# Patient Record
Sex: MALE | Race: WHITE | NOT HISPANIC OR LATINO
[De-identification: names, ages, dates, MRNs, and addresses within clinical notes are randomized per-mention and may not be internally consistent; named-entity substitution may affect disease eponyms.]

---

## 2017-01-19 ENCOUNTER — APPOINTMENT (OUTPATIENT)
Dept: NEUROLOGY | Facility: CLINIC | Age: 67
End: 2017-01-19

## 2017-01-19 VITALS
HEART RATE: 82 BPM | WEIGHT: 230 LBS | DIASTOLIC BLOOD PRESSURE: 83 MMHG | BODY MASS INDEX: 31.15 KG/M2 | HEIGHT: 72 IN | SYSTOLIC BLOOD PRESSURE: 124 MMHG

## 2017-01-24 ENCOUNTER — FORM ENCOUNTER (OUTPATIENT)
Age: 67
End: 2017-01-24

## 2017-01-25 ENCOUNTER — OUTPATIENT (OUTPATIENT)
Dept: OUTPATIENT SERVICES | Facility: HOSPITAL | Age: 67
LOS: 1 days | End: 2017-01-25
Payer: MEDICARE

## 2017-01-25 ENCOUNTER — APPOINTMENT (OUTPATIENT)
Dept: MRI IMAGING | Facility: CLINIC | Age: 67
End: 2017-01-25

## 2017-01-25 DIAGNOSIS — Z98.89 OTHER SPECIFIED POSTPROCEDURAL STATES: Chronic | ICD-10-CM

## 2017-01-25 DIAGNOSIS — D32.9 BENIGN NEOPLASM OF MENINGES, UNSPECIFIED: ICD-10-CM

## 2017-01-25 PROCEDURE — 70544 MR ANGIOGRAPHY HEAD W/O DYE: CPT

## 2017-01-26 ENCOUNTER — FORM ENCOUNTER (OUTPATIENT)
Age: 67
End: 2017-01-26

## 2017-01-27 ENCOUNTER — APPOINTMENT (OUTPATIENT)
Dept: MRI IMAGING | Facility: CLINIC | Age: 67
End: 2017-01-27

## 2017-01-27 ENCOUNTER — APPOINTMENT (OUTPATIENT)
Dept: OTOLARYNGOLOGY | Facility: CLINIC | Age: 67
End: 2017-01-27

## 2017-01-27 ENCOUNTER — OUTPATIENT (OUTPATIENT)
Dept: OUTPATIENT SERVICES | Facility: HOSPITAL | Age: 67
LOS: 1 days | End: 2017-01-27
Payer: MEDICARE

## 2017-01-27 VITALS
SYSTOLIC BLOOD PRESSURE: 144 MMHG | DIASTOLIC BLOOD PRESSURE: 89 MMHG | HEART RATE: 76 BPM | HEIGHT: 72 IN | BODY MASS INDEX: 31.15 KG/M2 | WEIGHT: 230 LBS

## 2017-01-27 DIAGNOSIS — J34.2 DEVIATED NASAL SEPTUM: ICD-10-CM

## 2017-01-27 DIAGNOSIS — H61.23 IMPACTED CERUMEN, BILATERAL: ICD-10-CM

## 2017-01-27 DIAGNOSIS — H90.3 SENSORINEURAL HEARING LOSS, BILATERAL: ICD-10-CM

## 2017-01-27 DIAGNOSIS — D32.9 BENIGN NEOPLASM OF MENINGES, UNSPECIFIED: ICD-10-CM

## 2017-01-27 DIAGNOSIS — H93.13 TINNITUS, BILATERAL: ICD-10-CM

## 2017-01-27 DIAGNOSIS — Z98.89 OTHER SPECIFIED POSTPROCEDURAL STATES: Chronic | ICD-10-CM

## 2017-01-27 DIAGNOSIS — Z00.8 ENCOUNTER FOR OTHER GENERAL EXAMINATION: ICD-10-CM

## 2017-01-27 DIAGNOSIS — J31.0 CHRONIC RHINITIS: ICD-10-CM

## 2017-01-27 DIAGNOSIS — Z83.3 FAMILY HISTORY OF DIABETES MELLITUS: ICD-10-CM

## 2017-01-27 PROCEDURE — 70551 MRI BRAIN STEM W/O DYE: CPT

## 2017-02-21 ENCOUNTER — APPOINTMENT (OUTPATIENT)
Dept: NEUROLOGY | Facility: CLINIC | Age: 67
End: 2017-02-21

## 2017-03-02 ENCOUNTER — FORM ENCOUNTER (OUTPATIENT)
Age: 67
End: 2017-03-02

## 2017-03-03 ENCOUNTER — APPOINTMENT (OUTPATIENT)
Dept: MRI IMAGING | Facility: CLINIC | Age: 67
End: 2017-03-03

## 2017-03-03 ENCOUNTER — OUTPATIENT (OUTPATIENT)
Dept: OUTPATIENT SERVICES | Facility: HOSPITAL | Age: 67
LOS: 1 days | End: 2017-03-03
Payer: MEDICARE

## 2017-03-03 DIAGNOSIS — I65.29 OCCLUSION AND STENOSIS OF UNSPECIFIED CAROTID ARTERY: ICD-10-CM

## 2017-03-03 DIAGNOSIS — Z98.89 OTHER SPECIFIED POSTPROCEDURAL STATES: Chronic | ICD-10-CM

## 2017-03-08 ENCOUNTER — APPOINTMENT (OUTPATIENT)
Dept: NEUROSURGERY | Facility: CLINIC | Age: 67
End: 2017-03-08

## 2017-03-08 VITALS
BODY MASS INDEX: 30.48 KG/M2 | SYSTOLIC BLOOD PRESSURE: 133 MMHG | DIASTOLIC BLOOD PRESSURE: 88 MMHG | WEIGHT: 225 LBS | RESPIRATION RATE: 16 BRPM | TEMPERATURE: 97.3 F | OXYGEN SATURATION: 95 % | HEIGHT: 72 IN | HEART RATE: 72 BPM

## 2017-03-08 DIAGNOSIS — F41.9 ANXIETY DISORDER, UNSPECIFIED: ICD-10-CM

## 2017-03-08 DIAGNOSIS — R56.9 UNSPECIFIED CONVULSIONS: ICD-10-CM

## 2017-03-08 DIAGNOSIS — I63.9 CEREBRAL INFARCTION, UNSPECIFIED: ICD-10-CM

## 2017-03-08 DIAGNOSIS — C80.1 MALIGNANT (PRIMARY) NEOPLASM, UNSPECIFIED: ICD-10-CM

## 2017-03-09 ENCOUNTER — FORM ENCOUNTER (OUTPATIENT)
Age: 67
End: 2017-03-09

## 2017-03-10 ENCOUNTER — OUTPATIENT (OUTPATIENT)
Dept: OUTPATIENT SERVICES | Facility: HOSPITAL | Age: 67
LOS: 1 days | End: 2017-03-10
Payer: MEDICARE

## 2017-03-10 ENCOUNTER — APPOINTMENT (OUTPATIENT)
Dept: CT IMAGING | Facility: HOSPITAL | Age: 67
End: 2017-03-10

## 2017-03-10 VITALS
HEIGHT: 72 IN | HEART RATE: 80 BPM | RESPIRATION RATE: 14 BRPM | SYSTOLIC BLOOD PRESSURE: 153 MMHG | WEIGHT: 229.94 LBS | DIASTOLIC BLOOD PRESSURE: 95 MMHG | TEMPERATURE: 97 F | OXYGEN SATURATION: 97 %

## 2017-03-10 DIAGNOSIS — Z01.818 ENCOUNTER FOR OTHER PREPROCEDURAL EXAMINATION: ICD-10-CM

## 2017-03-10 DIAGNOSIS — S52.532A COLLES' FRACTURE OF LEFT RADIUS, INITIAL ENCOUNTER FOR CLOSED FRACTURE: Chronic | ICD-10-CM

## 2017-03-10 DIAGNOSIS — G47.33 OBSTRUCTIVE SLEEP APNEA (ADULT) (PEDIATRIC): ICD-10-CM

## 2017-03-10 DIAGNOSIS — I65.21 OCCLUSION AND STENOSIS OF RIGHT CAROTID ARTERY: ICD-10-CM

## 2017-03-10 DIAGNOSIS — C43.59 MALIGNANT MELANOMA OF OTHER PART OF TRUNK: Chronic | ICD-10-CM

## 2017-03-10 DIAGNOSIS — I65.29 OCCLUSION AND STENOSIS OF UNSPECIFIED CAROTID ARTERY: ICD-10-CM

## 2017-03-10 DIAGNOSIS — Z98.89 OTHER SPECIFIED POSTPROCEDURAL STATES: Chronic | ICD-10-CM

## 2017-03-10 DIAGNOSIS — Z79.82 LONG TERM (CURRENT) USE OF ASPIRIN: ICD-10-CM

## 2017-03-10 LAB
ANION GAP SERPL CALC-SCNC: 12 MMOL/L — SIGNIFICANT CHANGE UP (ref 5–17)
BLD GP AB SCN SERPL QL: NEGATIVE — SIGNIFICANT CHANGE UP
BUN SERPL-MCNC: 18 MG/DL — SIGNIFICANT CHANGE UP (ref 7–23)
CALCIUM SERPL-MCNC: 9.6 MG/DL — SIGNIFICANT CHANGE UP (ref 8.4–10.5)
CHLORIDE SERPL-SCNC: 106 MMOL/L — SIGNIFICANT CHANGE UP (ref 96–108)
CO2 SERPL-SCNC: 21 MMOL/L — LOW (ref 22–31)
CREAT SERPL-MCNC: 1 MG/DL — SIGNIFICANT CHANGE UP (ref 0.5–1.3)
GLUCOSE SERPL-MCNC: 137 MG/DL — HIGH (ref 70–99)
HCT VFR BLD CALC: 44 % — SIGNIFICANT CHANGE UP (ref 39–50)
HGB BLD-MCNC: 15.4 G/DL — SIGNIFICANT CHANGE UP (ref 13–17)
MCHC RBC-ENTMCNC: 32.1 PG — SIGNIFICANT CHANGE UP (ref 27–34)
MCHC RBC-ENTMCNC: 35 GM/DL — SIGNIFICANT CHANGE UP (ref 32–36)
MCV RBC AUTO: 91.7 FL — SIGNIFICANT CHANGE UP (ref 80–100)
PLATELET # BLD AUTO: 150 K/UL — SIGNIFICANT CHANGE UP (ref 150–400)
POTASSIUM SERPL-MCNC: 3.9 MMOL/L — SIGNIFICANT CHANGE UP (ref 3.5–5.3)
POTASSIUM SERPL-SCNC: 3.9 MMOL/L — SIGNIFICANT CHANGE UP (ref 3.5–5.3)
RBC # BLD: 4.8 M/UL — SIGNIFICANT CHANGE UP (ref 4.2–5.8)
RBC # FLD: 12.3 % — SIGNIFICANT CHANGE UP (ref 10.3–14.5)
RH IG SCN BLD-IMP: POSITIVE — SIGNIFICANT CHANGE UP
SODIUM SERPL-SCNC: 139 MMOL/L — SIGNIFICANT CHANGE UP (ref 135–145)
WBC # BLD: 5.82 K/UL — SIGNIFICANT CHANGE UP (ref 3.8–10.5)
WBC # FLD AUTO: 5.82 K/UL — SIGNIFICANT CHANGE UP (ref 3.8–10.5)

## 2017-03-10 PROCEDURE — 86900 BLOOD TYPING SEROLOGIC ABO: CPT

## 2017-03-10 PROCEDURE — 86850 RBC ANTIBODY SCREEN: CPT

## 2017-03-10 PROCEDURE — 70498 CT ANGIOGRAPHY NECK: CPT | Mod: 26

## 2017-03-10 PROCEDURE — G0463: CPT

## 2017-03-10 PROCEDURE — 80048 BASIC METABOLIC PNL TOTAL CA: CPT

## 2017-03-10 PROCEDURE — 86901 BLOOD TYPING SEROLOGIC RH(D): CPT

## 2017-03-10 PROCEDURE — 70498 CT ANGIOGRAPHY NECK: CPT

## 2017-03-10 PROCEDURE — 93010 ELECTROCARDIOGRAM REPORT: CPT

## 2017-03-10 PROCEDURE — 85027 COMPLETE CBC AUTOMATED: CPT

## 2017-03-10 PROCEDURE — 93005 ELECTROCARDIOGRAM TRACING: CPT

## 2017-03-10 RX ORDER — CEFAZOLIN SODIUM 1 G
2000 VIAL (EA) INJECTION ONCE
Qty: 0 | Refills: 0 | Status: DISCONTINUED | OUTPATIENT
Start: 2017-03-13 | End: 2017-03-14

## 2017-03-10 NOTE — H&P PST ADULT - NEGATIVE CARDIOVASCULAR SYMPTOMS
no dyspnea on exertion/no paroxysmal nocturnal dyspnea/no peripheral edema/no orthopnea/no palpitations

## 2017-03-10 NOTE — H&P PST ADULT - PMH
Anxiety    Colles' fracture    Fatty liver  dx 1995  H/O meningioma of the brain  s/p craniotomy 2001  Hyperlipidemia    Malignant melanoma  abdomen - s/p excision [facility]  Obstructive sleep apnea  non-compliant with cpap  Occlusion of right carotid artery    Seizure disorder  last seizure 5 years ago, per pt Anxiety    Colles' fracture    ETOH abuse  past history, no ETOH since 1984  Fatty liver  dx 1995  H/O meningioma of the brain  s/p craniotomy for excision 2001  Hyperlipidemia    Malignant melanoma  abdomen - s/p excision [facility]  Obstructive sleep apnea  non-compliant with cpap  Occlusion of right carotid artery    Seizure disorder  last seizure 2011

## 2017-03-10 NOTE — H&P PST ADULT - ENT GEN HX ROS MEA POS PC
nasal congestion intermittent jaw pain - has seen ENT and is being fitted for bite guard/nasal congestion

## 2017-03-10 NOTE — H&P PST ADULT - HISTORY OF PRESENT ILLNESS
67 yo male with h/o meningioma s/p craniotomy for resection 2001, seizures (last 2011) with incidental finding of severe right carotid artery stenosis on MRA 3/3/2017. Pt is scheduled for right carotid endarterectomy on 3/13/2017. 67 yo male with h/o left parietal meningioma s/p craniotomy for resection 2001, seizures (last 2011) with incidental finding of severe right carotid artery stenosis on MRA 3/3/2017. Pt is scheduled for right carotid endarterectomy on 3/13/2017.

## 2017-03-10 NOTE — H&P PST ADULT - PSH
H/O colonoscopy    History of craniotomy  2001 Closed Colles' fracture of left radius  s/p ORIF 2015  H/O colonoscopy    History of craniotomy  2001 - meningioma - Dr Porras  Malignant melanoma of abdomen  s/p excision

## 2017-03-10 NOTE — H&P PST ADULT - ASSESSMENT
1.	Carotid artery stenosis  2.	Aspirin - long term use 1.	Carotid artery stenosis  2.	Aspirin - long term use  3.	HAYLEY Precautions

## 2017-03-13 ENCOUNTER — INPATIENT (INPATIENT)
Facility: HOSPITAL | Age: 67
LOS: 1 days | Discharge: HOME | DRG: 39 | End: 2017-03-15
Attending: NEUROLOGICAL SURGERY | Admitting: NEUROLOGICAL SURGERY
Payer: MEDICARE

## 2017-03-13 ENCOUNTER — TRANSCRIPTION ENCOUNTER (OUTPATIENT)
Age: 67
End: 2017-03-13

## 2017-03-13 ENCOUNTER — APPOINTMENT (OUTPATIENT)
Dept: NEUROSURGERY | Facility: CLINIC | Age: 67
End: 2017-03-13

## 2017-03-13 VITALS
TEMPERATURE: 98 F | HEART RATE: 67 BPM | SYSTOLIC BLOOD PRESSURE: 125 MMHG | DIASTOLIC BLOOD PRESSURE: 83 MMHG | WEIGHT: 229.94 LBS | RESPIRATION RATE: 20 BRPM | OXYGEN SATURATION: 98 % | HEIGHT: 72 IN

## 2017-03-13 DIAGNOSIS — S52.532A COLLES' FRACTURE OF LEFT RADIUS, INITIAL ENCOUNTER FOR CLOSED FRACTURE: Chronic | ICD-10-CM

## 2017-03-13 DIAGNOSIS — I65.29 OCCLUSION AND STENOSIS OF UNSPECIFIED CAROTID ARTERY: ICD-10-CM

## 2017-03-13 DIAGNOSIS — Z98.89 OTHER SPECIFIED POSTPROCEDURAL STATES: Chronic | ICD-10-CM

## 2017-03-13 DIAGNOSIS — Z01.818 ENCOUNTER FOR OTHER PREPROCEDURAL EXAMINATION: ICD-10-CM

## 2017-03-13 DIAGNOSIS — C43.59 MALIGNANT MELANOMA OF OTHER PART OF TRUNK: Chronic | ICD-10-CM

## 2017-03-13 LAB
ANION GAP SERPL CALC-SCNC: 18 MMOL/L — HIGH (ref 5–17)
APTT BLD: 30 SEC — SIGNIFICANT CHANGE UP (ref 27.5–37.4)
BUN SERPL-MCNC: 18 MG/DL — SIGNIFICANT CHANGE UP (ref 7–23)
CALCIUM SERPL-MCNC: 8.5 MG/DL — SIGNIFICANT CHANGE UP (ref 8.4–10.5)
CHLORIDE SERPL-SCNC: 105 MMOL/L — SIGNIFICANT CHANGE UP (ref 96–108)
CO2 SERPL-SCNC: 18 MMOL/L — LOW (ref 22–31)
CREAT SERPL-MCNC: 1.04 MG/DL — SIGNIFICANT CHANGE UP (ref 0.5–1.3)
GLUCOSE SERPL-MCNC: 203 MG/DL — HIGH (ref 70–99)
HCT VFR BLD CALC: 40.5 % — SIGNIFICANT CHANGE UP (ref 39–50)
HGB BLD-MCNC: 14.6 G/DL — SIGNIFICANT CHANGE UP (ref 13–17)
INR BLD: 1.24 RATIO — HIGH (ref 0.88–1.16)
MAGNESIUM SERPL-MCNC: 2.3 MG/DL — SIGNIFICANT CHANGE UP (ref 1.6–2.6)
MCHC RBC-ENTMCNC: 32.9 PG — SIGNIFICANT CHANGE UP (ref 27–34)
MCHC RBC-ENTMCNC: 36 GM/DL — SIGNIFICANT CHANGE UP (ref 32–36)
MCV RBC AUTO: 91.4 FL — SIGNIFICANT CHANGE UP (ref 80–100)
PHOSPHATE SERPL-MCNC: 2.8 MG/DL — SIGNIFICANT CHANGE UP (ref 2.5–4.5)
PLATELET # BLD AUTO: 130 K/UL — LOW (ref 150–400)
POTASSIUM SERPL-MCNC: 4.1 MMOL/L — SIGNIFICANT CHANGE UP (ref 3.5–5.3)
POTASSIUM SERPL-SCNC: 4.1 MMOL/L — SIGNIFICANT CHANGE UP (ref 3.5–5.3)
PROTHROM AB SERPL-ACNC: 13.4 SEC — HIGH (ref 10–13.1)
RBC # BLD: 4.43 M/UL — SIGNIFICANT CHANGE UP (ref 4.2–5.8)
RBC # FLD: 11.3 % — SIGNIFICANT CHANGE UP (ref 10.3–14.5)
SODIUM SERPL-SCNC: 141 MMOL/L — SIGNIFICANT CHANGE UP (ref 135–145)
WBC # BLD: 9.1 K/UL — SIGNIFICANT CHANGE UP (ref 3.8–10.5)
WBC # FLD AUTO: 9.1 K/UL — SIGNIFICANT CHANGE UP (ref 3.8–10.5)

## 2017-03-13 PROCEDURE — 35301 RECHANNELING OF ARTERY: CPT | Mod: RT

## 2017-03-13 PROCEDURE — 93882 EXTRACRANIAL UNI/LTD STUDY: CPT | Mod: 26

## 2017-03-13 PROCEDURE — 92240 ICG ANGIOGRAPHY I&R UNI/BI: CPT | Mod: 26

## 2017-03-13 PROCEDURE — 99291 CRITICAL CARE FIRST HOUR: CPT

## 2017-03-13 RX ORDER — SODIUM CHLORIDE 9 MG/ML
3 INJECTION INTRAMUSCULAR; INTRAVENOUS; SUBCUTANEOUS EVERY 8 HOURS
Qty: 0 | Refills: 0 | Status: DISCONTINUED | OUTPATIENT
Start: 2017-03-13 | End: 2017-03-13

## 2017-03-13 RX ORDER — ACETAMINOPHEN 500 MG
1000 TABLET ORAL ONCE
Qty: 0 | Refills: 0 | Status: COMPLETED | OUTPATIENT
Start: 2017-03-13 | End: 2017-03-13

## 2017-03-13 RX ORDER — SENNA PLUS 8.6 MG/1
2 TABLET ORAL AT BEDTIME
Qty: 0 | Refills: 0 | Status: DISCONTINUED | OUTPATIENT
Start: 2017-03-13 | End: 2017-03-15

## 2017-03-13 RX ORDER — CEFAZOLIN SODIUM 1 G
1000 VIAL (EA) INJECTION EVERY 8 HOURS
Qty: 0 | Refills: 0 | Status: DISCONTINUED | OUTPATIENT
Start: 2017-03-13 | End: 2017-03-14

## 2017-03-13 RX ORDER — HYDROMORPHONE HYDROCHLORIDE 2 MG/ML
0.5 INJECTION INTRAMUSCULAR; INTRAVENOUS; SUBCUTANEOUS ONCE
Qty: 0 | Refills: 0 | Status: DISCONTINUED | OUTPATIENT
Start: 2017-03-13 | End: 2017-03-13

## 2017-03-13 RX ORDER — SIMVASTATIN 20 MG/1
40 TABLET, FILM COATED ORAL AT BEDTIME
Qty: 0 | Refills: 0 | Status: DISCONTINUED | OUTPATIENT
Start: 2017-03-13 | End: 2017-03-15

## 2017-03-13 RX ORDER — ASPIRIN/CALCIUM CARB/MAGNESIUM 324 MG
81 TABLET ORAL DAILY
Qty: 0 | Refills: 0 | Status: DISCONTINUED | OUTPATIENT
Start: 2017-03-13 | End: 2017-03-15

## 2017-03-13 RX ORDER — DOCUSATE SODIUM 100 MG
100 CAPSULE ORAL THREE TIMES A DAY
Qty: 0 | Refills: 0 | Status: DISCONTINUED | OUTPATIENT
Start: 2017-03-13 | End: 2017-03-15

## 2017-03-13 RX ORDER — ALPRAZOLAM 0.25 MG
0.5 TABLET ORAL THREE TIMES A DAY
Qty: 0 | Refills: 0 | Status: DISCONTINUED | OUTPATIENT
Start: 2017-03-13 | End: 2017-03-15

## 2017-03-13 RX ORDER — PHENYLEPHRINE HYDROCHLORIDE 10 MG/ML
0.4 INJECTION INTRAVENOUS
Qty: 80 | Refills: 0 | Status: DISCONTINUED | OUTPATIENT
Start: 2017-03-13 | End: 2017-03-14

## 2017-03-13 RX ORDER — PANTOPRAZOLE SODIUM 20 MG/1
40 TABLET, DELAYED RELEASE ORAL DAILY
Qty: 0 | Refills: 0 | Status: DISCONTINUED | OUTPATIENT
Start: 2017-03-13 | End: 2017-03-14

## 2017-03-13 RX ORDER — DEXTROSE MONOHYDRATE, SODIUM CHLORIDE, AND POTASSIUM CHLORIDE 50; .745; 4.5 G/1000ML; G/1000ML; G/1000ML
1000 INJECTION, SOLUTION INTRAVENOUS
Qty: 0 | Refills: 0 | Status: DISCONTINUED | OUTPATIENT
Start: 2017-03-13 | End: 2017-03-14

## 2017-03-13 RX ORDER — ACETAMINOPHEN 500 MG
650 TABLET ORAL EVERY 6 HOURS
Qty: 0 | Refills: 0 | Status: DISCONTINUED | OUTPATIENT
Start: 2017-03-13 | End: 2017-03-15

## 2017-03-13 RX ORDER — TAMSULOSIN HYDROCHLORIDE 0.4 MG/1
0.4 CAPSULE ORAL AT BEDTIME
Qty: 0 | Refills: 0 | Status: DISCONTINUED | OUTPATIENT
Start: 2017-03-13 | End: 2017-03-15

## 2017-03-13 RX ORDER — ONDANSETRON 8 MG/1
4 TABLET, FILM COATED ORAL EVERY 6 HOURS
Qty: 0 | Refills: 0 | Status: DISCONTINUED | OUTPATIENT
Start: 2017-03-13 | End: 2017-03-15

## 2017-03-13 RX ORDER — ALPRAZOLAM 0.25 MG
0.5 TABLET ORAL
Qty: 0 | Refills: 0 | COMMUNITY

## 2017-03-13 RX ORDER — TOPIRAMATE 25 MG
100 TABLET ORAL
Qty: 0 | Refills: 0 | Status: DISCONTINUED | OUTPATIENT
Start: 2017-03-13 | End: 2017-03-15

## 2017-03-13 RX ADMIN — Medication 1000 MILLIGRAM(S): at 19:45

## 2017-03-13 RX ADMIN — SODIUM CHLORIDE 3 MILLILITER(S): 9 INJECTION INTRAMUSCULAR; INTRAVENOUS; SUBCUTANEOUS at 11:54

## 2017-03-13 RX ADMIN — Medication 81 MILLIGRAM(S): at 22:35

## 2017-03-13 RX ADMIN — DEXTROSE MONOHYDRATE, SODIUM CHLORIDE, AND POTASSIUM CHLORIDE 100 MILLILITER(S): 50; .745; 4.5 INJECTION, SOLUTION INTRAVENOUS at 19:07

## 2017-03-13 RX ADMIN — Medication 100 MILLIGRAM(S): at 22:35

## 2017-03-13 RX ADMIN — PANTOPRAZOLE SODIUM 40 MILLIGRAM(S): 20 TABLET, DELAYED RELEASE ORAL at 22:35

## 2017-03-13 RX ADMIN — SIMVASTATIN 40 MILLIGRAM(S): 20 TABLET, FILM COATED ORAL at 22:35

## 2017-03-13 RX ADMIN — HYDROMORPHONE HYDROCHLORIDE 0.5 MILLIGRAM(S): 2 INJECTION INTRAMUSCULAR; INTRAVENOUS; SUBCUTANEOUS at 23:00

## 2017-03-13 RX ADMIN — TAMSULOSIN HYDROCHLORIDE 0.4 MILLIGRAM(S): 0.4 CAPSULE ORAL at 22:36

## 2017-03-13 RX ADMIN — HYDROMORPHONE HYDROCHLORIDE 0.5 MILLIGRAM(S): 2 INJECTION INTRAMUSCULAR; INTRAVENOUS; SUBCUTANEOUS at 23:15

## 2017-03-13 RX ADMIN — Medication 400 MILLIGRAM(S): at 19:30

## 2017-03-14 PROCEDURE — 70498 CT ANGIOGRAPHY NECK: CPT | Mod: 26

## 2017-03-14 RX ORDER — HYDRALAZINE HCL 50 MG
5 TABLET ORAL ONCE
Qty: 0 | Refills: 0 | Status: DISCONTINUED | OUTPATIENT
Start: 2017-03-14 | End: 2017-03-15

## 2017-03-14 RX ORDER — INSULIN LISPRO 100/ML
VIAL (ML) SUBCUTANEOUS
Qty: 0 | Refills: 0 | Status: DISCONTINUED | OUTPATIENT
Start: 2017-03-14 | End: 2017-03-14

## 2017-03-14 RX ADMIN — Medication 650 MILLIGRAM(S): at 13:53

## 2017-03-14 RX ADMIN — TAMSULOSIN HYDROCHLORIDE 0.4 MILLIGRAM(S): 0.4 CAPSULE ORAL at 21:21

## 2017-03-14 RX ADMIN — Medication 0.5 MILLIGRAM(S): at 19:33

## 2017-03-14 RX ADMIN — Medication 0.5 MILLIGRAM(S): at 03:27

## 2017-03-14 RX ADMIN — Medication 100 MILLIGRAM(S): at 05:17

## 2017-03-14 RX ADMIN — SENNA PLUS 2 TABLET(S): 8.6 TABLET ORAL at 21:21

## 2017-03-14 RX ADMIN — Medication 100 MILLIGRAM(S): at 14:40

## 2017-03-14 RX ADMIN — Medication 100 MILLIGRAM(S): at 18:34

## 2017-03-14 RX ADMIN — ONDANSETRON 4 MILLIGRAM(S): 8 TABLET, FILM COATED ORAL at 05:42

## 2017-03-14 RX ADMIN — Medication 81 MILLIGRAM(S): at 11:19

## 2017-03-14 RX ADMIN — Medication 650 MILLIGRAM(S): at 14:23

## 2017-03-14 RX ADMIN — Medication 100 MILLIGRAM(S): at 21:21

## 2017-03-14 RX ADMIN — SIMVASTATIN 40 MILLIGRAM(S): 20 TABLET, FILM COATED ORAL at 21:21

## 2017-03-15 ENCOUNTER — TRANSCRIPTION ENCOUNTER (OUTPATIENT)
Age: 67
End: 2017-03-15

## 2017-03-15 VITALS
DIASTOLIC BLOOD PRESSURE: 80 MMHG | SYSTOLIC BLOOD PRESSURE: 133 MMHG | TEMPERATURE: 98 F | HEART RATE: 69 BPM | RESPIRATION RATE: 18 BRPM | OXYGEN SATURATION: 97 %

## 2017-03-15 LAB
ANION GAP SERPL CALC-SCNC: 15 MMOL/L — SIGNIFICANT CHANGE UP (ref 5–17)
BUN SERPL-MCNC: 19 MG/DL — SIGNIFICANT CHANGE UP (ref 7–23)
CALCIUM SERPL-MCNC: 9 MG/DL — SIGNIFICANT CHANGE UP (ref 8.4–10.5)
CHLORIDE SERPL-SCNC: 107 MMOL/L — SIGNIFICANT CHANGE UP (ref 96–108)
CO2 SERPL-SCNC: 20 MMOL/L — LOW (ref 22–31)
CREAT SERPL-MCNC: 1.02 MG/DL — SIGNIFICANT CHANGE UP (ref 0.5–1.3)
GLUCOSE SERPL-MCNC: 110 MG/DL — HIGH (ref 70–99)
HBA1C BLD-MCNC: 5.5 % — SIGNIFICANT CHANGE UP (ref 4–5.6)
HCT VFR BLD CALC: 42.6 % — SIGNIFICANT CHANGE UP (ref 39–50)
HGB BLD-MCNC: 14.6 G/DL — SIGNIFICANT CHANGE UP (ref 13–17)
MCHC RBC-ENTMCNC: 32 PG — SIGNIFICANT CHANGE UP (ref 27–34)
MCHC RBC-ENTMCNC: 34.2 GM/DL — SIGNIFICANT CHANGE UP (ref 32–36)
MCV RBC AUTO: 93.4 FL — SIGNIFICANT CHANGE UP (ref 80–100)
PLATELET # BLD AUTO: 128 K/UL — LOW (ref 150–400)
POTASSIUM SERPL-MCNC: 4.4 MMOL/L — SIGNIFICANT CHANGE UP (ref 3.5–5.3)
POTASSIUM SERPL-SCNC: 4.4 MMOL/L — SIGNIFICANT CHANGE UP (ref 3.5–5.3)
RBC # BLD: 4.56 M/UL — SIGNIFICANT CHANGE UP (ref 4.2–5.8)
RBC # FLD: 11.3 % — SIGNIFICANT CHANGE UP (ref 10.3–14.5)
SODIUM SERPL-SCNC: 142 MMOL/L — SIGNIFICANT CHANGE UP (ref 135–145)
WBC # BLD: 10.3 K/UL — SIGNIFICANT CHANGE UP (ref 3.8–10.5)
WBC # FLD AUTO: 10.3 K/UL — SIGNIFICANT CHANGE UP (ref 3.8–10.5)

## 2017-03-15 PROCEDURE — 83735 ASSAY OF MAGNESIUM: CPT

## 2017-03-15 PROCEDURE — 70498 CT ANGIOGRAPHY NECK: CPT

## 2017-03-15 PROCEDURE — 80048 BASIC METABOLIC PNL TOTAL CA: CPT

## 2017-03-15 PROCEDURE — 84100 ASSAY OF PHOSPHORUS: CPT

## 2017-03-15 PROCEDURE — C1889: CPT

## 2017-03-15 PROCEDURE — C1769: CPT

## 2017-03-15 PROCEDURE — 85027 COMPLETE CBC AUTOMATED: CPT

## 2017-03-15 PROCEDURE — 83036 HEMOGLOBIN GLYCOSYLATED A1C: CPT

## 2017-03-15 PROCEDURE — 85610 PROTHROMBIN TIME: CPT

## 2017-03-15 PROCEDURE — 85730 THROMBOPLASTIN TIME PARTIAL: CPT

## 2017-03-15 RX ORDER — SIMVASTATIN 20 MG/1
1 TABLET, FILM COATED ORAL
Qty: 0 | Refills: 0 | COMMUNITY

## 2017-03-15 RX ORDER — ALPRAZOLAM 0.25 MG
1 TABLET ORAL
Qty: 20 | Refills: 0 | OUTPATIENT
Start: 2017-03-15

## 2017-03-15 RX ORDER — ALPRAZOLAM 0.25 MG
1 TABLET ORAL
Qty: 0 | Refills: 0 | COMMUNITY
Start: 2017-03-15

## 2017-03-15 RX ORDER — TOPIRAMATE 25 MG
1 TABLET ORAL
Qty: 0 | Refills: 0 | COMMUNITY
Start: 2017-03-15

## 2017-03-15 RX ORDER — ASPIRIN/CALCIUM CARB/MAGNESIUM 324 MG
1 TABLET ORAL
Qty: 0 | Refills: 0 | COMMUNITY

## 2017-03-15 RX ORDER — SIMVASTATIN 20 MG/1
1 TABLET, FILM COATED ORAL
Qty: 0 | Refills: 0 | COMMUNITY
Start: 2017-03-15

## 2017-03-15 RX ORDER — ACETAMINOPHEN 500 MG
2 TABLET ORAL
Qty: 0 | Refills: 0 | COMMUNITY
Start: 2017-03-15

## 2017-03-15 RX ORDER — TAMSULOSIN HYDROCHLORIDE 0.4 MG/1
1 CAPSULE ORAL
Qty: 0 | Refills: 0 | COMMUNITY
Start: 2017-03-15

## 2017-03-15 RX ORDER — ALPRAZOLAM 0.25 MG
1 TABLET ORAL
Qty: 0 | Refills: 0 | COMMUNITY

## 2017-03-15 RX ORDER — TAMSULOSIN HYDROCHLORIDE 0.4 MG/1
1 CAPSULE ORAL
Qty: 0 | Refills: 0 | COMMUNITY

## 2017-03-15 RX ORDER — ASPIRIN/CALCIUM CARB/MAGNESIUM 324 MG
1 TABLET ORAL
Qty: 0 | Refills: 0 | COMMUNITY
Start: 2017-03-15

## 2017-03-15 RX ORDER — TOPIRAMATE 25 MG
1 TABLET ORAL
Qty: 0 | Refills: 0 | COMMUNITY

## 2017-03-15 RX ADMIN — Medication 650 MILLIGRAM(S): at 05:10

## 2017-03-15 RX ADMIN — Medication 100 MILLIGRAM(S): at 05:10

## 2017-03-15 RX ADMIN — Medication 81 MILLIGRAM(S): at 11:14

## 2017-03-15 RX ADMIN — Medication 650 MILLIGRAM(S): at 11:14

## 2017-03-15 RX ADMIN — Medication 650 MILLIGRAM(S): at 05:44

## 2017-03-15 NOTE — DISCHARGE NOTE ADULT - NS AS DC STROKE ED MATERIALS
Prescribed Medications/Stroke Education Booklet/Need for Followup After Discharge/Risk Factors for Stroke/Call 911 for Stroke/Stroke Warning Signs and Symptoms

## 2017-03-15 NOTE — DISCHARGE NOTE ADULT - MEDICATION SUMMARY - MEDICATIONS TO TAKE
I will START or STAY ON the medications listed below when I get home from the hospital:    acetaminophen 325 mg oral tablet  -- 2 tab(s) by mouth every 6 hours, As needed, Mild Pain (1 - 3)  -- Indication: For Mild pain    aspirin 81 mg oral tablet, chewable  -- 1 tab(s) by mouth once a day  -- Indication: For stroke prevention    tamsulosin 0.4 mg oral capsule  -- 1 cap(s) by mouth once a day (at bedtime)  -- Indication: For BPH    topiramate 100 mg oral tablet  -- 1 tab(s) by mouth 2 times a day  -- Indication: For seizure    simvastatin 40 mg oral tablet  -- 1 tab(s) by mouth once a day (at bedtime)  -- Indication: For Hyperlipidemia    ALPRAZolam 0.5 mg oral tablet  -- 1 tab(s) by mouth 3 times a day, As needed, home med  -- Indication: For Anxiety

## 2017-03-15 NOTE — DISCHARGE NOTE ADULT - NS AS ACTIVITY OBS
Walking-Indoors allowed/Stairs allowed/Do not drive or operate machinery/Do not make important decisions/You may shower on Friday/Showering allowed/No Heavy lifting/straining/Walking-Outdoors allowed

## 2017-03-15 NOTE — DISCHARGE NOTE ADULT - HOSPITAL COURSE
65 yo male with h/o left parietal meningioma s/p craniotomy for resection 2001, seizures (last 2011) with incidental finding of severe right carotid artery stenosis on MRA 3/3/2017.     On 3/13/17 pt went to the OR for Right Carotid Endarterectomy. Pt tolerated surgery without complications. Pt remained on the Neuro ICU overnight and was then transferred to the floor when stable. Pt is medically and neurologically stable for discharge home.

## 2017-03-15 NOTE — DISCHARGE NOTE ADULT - ADDITIONAL INSTRUCTIONS
Any sign of fever, chills, lethargy or change in mental status, nausea or vomiting, severe pain or drainage from incision contact Dr Brown or go to ER.

## 2017-03-15 NOTE — DISCHARGE NOTE ADULT - CARE PROVIDER_API CALL
Hesham Brown), Neurosurgery  General  [address] [place] [address]  Phone: [phone]  Fax: [phone]    Primary Care Physician,   Phone: (   )    -  Fax: (   )    -

## 2017-03-15 NOTE — DISCHARGE NOTE ADULT - PATIENT PORTAL LINK FT
“You can access the Follow[facility] Patient Portal, offered by [facility], by registering with the following website: [email or web address]”

## 2017-03-15 NOTE — DISCHARGE NOTE ADULT - CARE PLAN
Principal Discharge DX:	Occlusion of right carotid artery  Goal:	3/13/17 s/p right carotid endarterectomy  Instructions for follow-up, activity and diet:	You may shower on Friday. Pat incision dry after shower. Steri strips will fall off on their own.  Secondary Diagnosis:	S/P carotid endarterectomy  Goal:	stable  Instructions for follow-up, activity and diet:	Continue aspirin. Make appt for follow up with Dr Brown's Nurse Practitioner in 1 week to check incision.  Secondary Diagnosis:	Hyperlipidemia  Goal:	stable  Instructions for follow-up, activity and diet:	Continue simvastatin  Secondary Diagnosis:	Seizure disorder  Goal:	stable  Instructions for follow-up, activity and diet:	Continue topamax

## 2017-03-15 NOTE — DISCHARGE NOTE ADULT - PLAN OF CARE
3/13/17 s/p right carotid endarterectomy You may shower on Friday. Pat incision dry after shower. Steri strips will fall off on their own. stable Continue aspirin. Make appt for follow up with Dr Brown's Nurse Practitioner in 1 week to check incision. Continue simvastatin Continue topamax

## 2017-03-15 NOTE — DISCHARGE NOTE ADULT - CARE PROVIDERS DIRECT ADDRESSES
[email or web address] [email or web address]

## 2017-03-22 ENCOUNTER — APPOINTMENT (OUTPATIENT)
Dept: NEUROSURGERY | Facility: CLINIC | Age: 67
End: 2017-03-22

## 2017-03-22 VITALS
HEIGHT: 72 IN | OXYGEN SATURATION: 96 % | HEART RATE: 87 BPM | DIASTOLIC BLOOD PRESSURE: 74 MMHG | WEIGHT: 225 LBS | RESPIRATION RATE: 16 BRPM | TEMPERATURE: 98.2 F | SYSTOLIC BLOOD PRESSURE: 126 MMHG | BODY MASS INDEX: 30.48 KG/M2

## 2017-03-22 PROBLEM — F41.9 ANXIETY: Status: RESOLVED | Noted: 2017-03-22 | Resolved: 2017-03-22

## 2017-04-13 PROCEDURE — A9585: CPT

## 2017-04-13 PROCEDURE — 70549 MR ANGIOGRAPH NECK W/O&W/DYE: CPT

## 2017-04-24 LAB
BUN SERPL-MCNC: 17 MG/DL
CREAT SERPL-MCNC: 1.09 MG/DL

## 2017-05-01 ENCOUNTER — APPOINTMENT (OUTPATIENT)
Dept: NEUROSURGERY | Facility: CLINIC | Age: 67
End: 2017-05-01

## 2017-05-01 VITALS
OXYGEN SATURATION: 95 % | DIASTOLIC BLOOD PRESSURE: 93 MMHG | TEMPERATURE: 97.5 F | SYSTOLIC BLOOD PRESSURE: 144 MMHG | HEART RATE: 73 BPM | WEIGHT: 230 LBS | BODY MASS INDEX: 31.15 KG/M2 | HEIGHT: 72 IN

## 2017-05-01 DIAGNOSIS — E78.5 HYPERLIPIDEMIA, UNSPECIFIED: ICD-10-CM

## 2017-07-14 ENCOUNTER — APPOINTMENT (OUTPATIENT)
Dept: NEUROLOGY | Facility: CLINIC | Age: 67
End: 2017-07-14

## 2017-07-14 VITALS
HEIGHT: 72 IN | HEART RATE: 69 BPM | WEIGHT: 225 LBS | SYSTOLIC BLOOD PRESSURE: 146 MMHG | DIASTOLIC BLOOD PRESSURE: 95 MMHG | BODY MASS INDEX: 30.48 KG/M2

## 2017-07-14 RX ORDER — CEFDINIR 300 MG/1
300 CAPSULE ORAL
Qty: 20 | Refills: 0 | Status: DISCONTINUED | COMMUNITY
Start: 2017-01-27

## 2017-07-14 RX ORDER — ESCITALOPRAM OXALATE 10 MG/1
10 TABLET ORAL DAILY
Qty: 90 | Refills: 0 | Status: DISCONTINUED | COMMUNITY
Start: 2017-01-19 | End: 2017-07-14

## 2017-07-14 RX ORDER — AMOXICILLIN 875 MG/1
875 TABLET, FILM COATED ORAL
Qty: 14 | Refills: 0 | Status: DISCONTINUED | COMMUNITY
Start: 2017-06-09

## 2017-07-14 RX ORDER — ALPRAZOLAM 0.5 MG/1
0.5 TABLET ORAL
Qty: 20 | Refills: 0 | Status: DISCONTINUED | COMMUNITY
Start: 2017-03-15

## 2017-07-17 ENCOUNTER — APPOINTMENT (OUTPATIENT)
Dept: DERMATOLOGY | Facility: CLINIC | Age: 67
End: 2017-07-17

## 2017-07-17 VITALS — WEIGHT: 225 LBS | HEIGHT: 72 IN | BODY MASS INDEX: 30.48 KG/M2

## 2017-07-17 DIAGNOSIS — L81.4 OTHER MELANIN HYPERPIGMENTATION: ICD-10-CM

## 2017-07-17 DIAGNOSIS — I78.1 NEVUS, NON-NEOPLASTIC: ICD-10-CM

## 2017-07-17 DIAGNOSIS — L90.5 SCAR CONDITIONS AND FIBROSIS OF SKIN: ICD-10-CM

## 2017-07-17 DIAGNOSIS — Z86.19 PERSONAL HISTORY OF OTHER INFECTIOUS AND PARASITIC DISEASES: ICD-10-CM

## 2017-07-17 DIAGNOSIS — B35.4 TINEA CORPORIS: ICD-10-CM

## 2017-07-17 DIAGNOSIS — L30.9 DERMATITIS, UNSPECIFIED: ICD-10-CM

## 2017-07-17 DIAGNOSIS — Z12.83 ENCOUNTER FOR SCREENING FOR MALIGNANT NEOPLASM OF SKIN: ICD-10-CM

## 2017-07-18 ENCOUNTER — MESSAGE (OUTPATIENT)
Age: 67
End: 2017-07-18

## 2017-07-18 LAB
ALBUMIN SERPL ELPH-MCNC: 4.4 G/DL
ALP BLD-CCNC: 71 U/L
ALT SERPL-CCNC: 33 U/L
ANION GAP SERPL CALC-SCNC: 16 MMOL/L
AST SERPL-CCNC: 26 U/L
BASOPHILS # BLD AUTO: 0.01 K/UL
BASOPHILS NFR BLD AUTO: 0.2 %
BILIRUB SERPL-MCNC: 0.7 MG/DL
BUN SERPL-MCNC: 22 MG/DL
CALCIUM SERPL-MCNC: 9 MG/DL
CHLORIDE SERPL-SCNC: 105 MMOL/L
CO2 SERPL-SCNC: 18 MMOL/L
CREAT SERPL-MCNC: 1.02 MG/DL
EOSINOPHIL # BLD AUTO: 0.07 K/UL
EOSINOPHIL NFR BLD AUTO: 1.2 %
GLUCOSE SERPL-MCNC: 103 MG/DL
HCT VFR BLD CALC: 41.8 %
HGB BLD-MCNC: 14.5 G/DL
IMM GRANULOCYTES NFR BLD AUTO: 0.2 %
LYMPHOCYTES # BLD AUTO: 1.5 K/UL
LYMPHOCYTES NFR BLD AUTO: 26.2 %
MAN DIFF?: NORMAL
MCHC RBC-ENTMCNC: 30.3 PG
MCHC RBC-ENTMCNC: 34.7 GM/DL
MCV RBC AUTO: 87.4 FL
MONOCYTES # BLD AUTO: 0.42 K/UL
MONOCYTES NFR BLD AUTO: 7.3 %
NEUTROPHILS # BLD AUTO: 3.71 K/UL
NEUTROPHILS NFR BLD AUTO: 64.9 %
PLATELET # BLD AUTO: 170 K/UL
POTASSIUM SERPL-SCNC: 4.1 MMOL/L
PROT SERPL-MCNC: 7.5 G/DL
RBC # BLD: 4.78 M/UL
RBC # FLD: 12.1 %
SODIUM SERPL-SCNC: 139 MMOL/L
WBC # FLD AUTO: 5.72 K/UL

## 2017-07-19 ENCOUNTER — APPOINTMENT (OUTPATIENT)
Dept: NEUROLOGY | Facility: CLINIC | Age: 67
End: 2017-07-19

## 2018-02-14 ENCOUNTER — APPOINTMENT (OUTPATIENT)
Dept: NEUROLOGY | Facility: CLINIC | Age: 68
End: 2018-02-14
Payer: MEDICARE

## 2018-02-14 VITALS
SYSTOLIC BLOOD PRESSURE: 160 MMHG | HEIGHT: 72 IN | BODY MASS INDEX: 31.15 KG/M2 | HEART RATE: 77 BPM | DIASTOLIC BLOOD PRESSURE: 90 MMHG | WEIGHT: 230 LBS

## 2018-02-14 PROCEDURE — 99214 OFFICE O/P EST MOD 30 MIN: CPT

## 2018-02-23 ENCOUNTER — APPOINTMENT (OUTPATIENT)
Dept: NEUROLOGY | Facility: CLINIC | Age: 68
End: 2018-02-23
Payer: MEDICARE

## 2018-02-23 PROCEDURE — 93886 INTRACRANIAL COMPLETE STUDY: CPT

## 2018-02-23 PROCEDURE — 93892 TCD EMBOLI DETECT W/O INJ: CPT

## 2018-02-23 PROCEDURE — 93880 EXTRACRANIAL BILAT STUDY: CPT

## 2018-03-02 ENCOUNTER — APPOINTMENT (OUTPATIENT)
Dept: MRI IMAGING | Facility: CLINIC | Age: 68
End: 2018-03-02

## 2018-03-02 ENCOUNTER — APPOINTMENT (OUTPATIENT)
Dept: ULTRASOUND IMAGING | Facility: CLINIC | Age: 68
End: 2018-03-02

## 2018-03-02 ENCOUNTER — OUTPATIENT (OUTPATIENT)
Dept: OUTPATIENT SERVICES | Facility: HOSPITAL | Age: 68
LOS: 1 days | End: 2018-03-02
Payer: MEDICARE

## 2018-03-02 DIAGNOSIS — Z98.89 OTHER SPECIFIED POSTPROCEDURAL STATES: Chronic | ICD-10-CM

## 2018-03-02 DIAGNOSIS — G40.909 EPILEPSY, UNSPECIFIED, NOT INTRACTABLE, WITHOUT STATUS EPILEPTICUS: ICD-10-CM

## 2018-03-02 DIAGNOSIS — S52.532A COLLES' FRACTURE OF LEFT RADIUS, INITIAL ENCOUNTER FOR CLOSED FRACTURE: Chronic | ICD-10-CM

## 2018-03-02 DIAGNOSIS — C43.59 MALIGNANT MELANOMA OF OTHER PART OF TRUNK: Chronic | ICD-10-CM

## 2018-03-02 PROCEDURE — 82565 ASSAY OF CREATININE: CPT

## 2018-03-07 ENCOUNTER — FORM ENCOUNTER (OUTPATIENT)
Age: 68
End: 2018-03-07

## 2018-03-08 ENCOUNTER — APPOINTMENT (OUTPATIENT)
Dept: MRI IMAGING | Facility: CLINIC | Age: 68
End: 2018-03-08
Payer: MEDICARE

## 2018-03-08 ENCOUNTER — OUTPATIENT (OUTPATIENT)
Dept: OUTPATIENT SERVICES | Facility: HOSPITAL | Age: 68
LOS: 1 days | End: 2018-03-08
Payer: MEDICARE

## 2018-03-08 DIAGNOSIS — Z98.89 OTHER SPECIFIED POSTPROCEDURAL STATES: Chronic | ICD-10-CM

## 2018-03-08 DIAGNOSIS — S52.532A COLLES' FRACTURE OF LEFT RADIUS, INITIAL ENCOUNTER FOR CLOSED FRACTURE: Chronic | ICD-10-CM

## 2018-03-08 DIAGNOSIS — C43.59 MALIGNANT MELANOMA OF OTHER PART OF TRUNK: Chronic | ICD-10-CM

## 2018-03-08 DIAGNOSIS — G40.909 EPILEPSY, UNSPECIFIED, NOT INTRACTABLE, WITHOUT STATUS EPILEPTICUS: ICD-10-CM

## 2018-03-08 PROCEDURE — 70549 MR ANGIOGRAPH NECK W/O&W/DYE: CPT | Mod: 26

## 2018-03-08 PROCEDURE — 70551 MRI BRAIN STEM W/O DYE: CPT

## 2018-03-08 PROCEDURE — 70551 MRI BRAIN STEM W/O DYE: CPT | Mod: 26

## 2018-03-08 PROCEDURE — A9585: CPT

## 2018-03-08 PROCEDURE — 82565 ASSAY OF CREATININE: CPT

## 2018-03-08 PROCEDURE — 70549 MR ANGIOGRAPH NECK W/O&W/DYE: CPT

## 2018-03-14 ENCOUNTER — TRANSCRIPTION ENCOUNTER (OUTPATIENT)
Age: 68
End: 2018-03-14

## 2018-06-27 ENCOUNTER — APPOINTMENT (OUTPATIENT)
Dept: ORTHOPEDIC SURGERY | Facility: CLINIC | Age: 68
End: 2018-06-27
Payer: MEDICARE

## 2018-06-27 DIAGNOSIS — M19.032 PRIMARY OSTEOARTHRITIS, LEFT WRIST: ICD-10-CM

## 2018-06-27 PROCEDURE — 73110 X-RAY EXAM OF WRIST: CPT | Mod: LT

## 2018-06-27 PROCEDURE — 99213 OFFICE O/P EST LOW 20 MIN: CPT

## 2018-06-28 PROBLEM — M19.032 ARTHRITIS OF LEFT WRIST: Status: ACTIVE | Noted: 2018-06-28

## 2018-07-27 PROBLEM — R56.9 SEIZURE: Status: RESOLVED | Noted: 2017-03-22 | Resolved: 2018-07-27

## 2018-08-15 ENCOUNTER — APPOINTMENT (OUTPATIENT)
Dept: NEUROSURGERY | Facility: CLINIC | Age: 68
End: 2018-08-15

## 2018-10-15 PROBLEM — E78.5 HYPERLIPIDEMIA, UNSPECIFIED: Chronic | Status: ACTIVE | Noted: 2017-03-10

## 2018-10-15 PROBLEM — I65.21 OCCLUSION AND STENOSIS OF RIGHT CAROTID ARTERY: Chronic | Status: ACTIVE | Noted: 2017-03-10

## 2018-10-15 PROBLEM — F10.10 ALCOHOL ABUSE, UNCOMPLICATED: Chronic | Status: ACTIVE | Noted: 2017-03-10

## 2018-10-15 PROBLEM — C43.9 MALIGNANT MELANOMA OF SKIN, UNSPECIFIED: Chronic | Status: ACTIVE | Noted: 2017-03-10

## 2018-10-15 PROBLEM — F41.9 ANXIETY DISORDER, UNSPECIFIED: Chronic | Status: ACTIVE | Noted: 2017-03-10

## 2018-10-15 PROBLEM — K76.0 FATTY (CHANGE OF) LIVER, NOT ELSEWHERE CLASSIFIED: Chronic | Status: ACTIVE | Noted: 2017-03-10

## 2018-10-15 PROBLEM — Z00.00 ENCOUNTER FOR PREVENTIVE HEALTH EXAMINATION: Noted: 2018-10-15

## 2018-10-22 ENCOUNTER — APPOINTMENT (OUTPATIENT)
Dept: NEUROSURGERY | Facility: CLINIC | Age: 68
End: 2018-10-22
Payer: MEDICARE

## 2018-10-22 VITALS
SYSTOLIC BLOOD PRESSURE: 169 MMHG | HEIGHT: 72 IN | TEMPERATURE: 98.5 F | RESPIRATION RATE: 17 BRPM | OXYGEN SATURATION: 96 % | HEART RATE: 76 BPM | DIASTOLIC BLOOD PRESSURE: 96 MMHG | WEIGHT: 245 LBS | BODY MASS INDEX: 33.18 KG/M2

## 2018-10-22 DIAGNOSIS — Z81.8 FAMILY HISTORY OF OTHER MENTAL AND BEHAVIORAL DISORDERS: ICD-10-CM

## 2018-10-22 PROCEDURE — 99214 OFFICE O/P EST MOD 30 MIN: CPT

## 2018-10-22 RX ORDER — TERBINAFINE HYDROCHLORIDE 250 MG/1
250 TABLET ORAL DAILY
Qty: 1 | Refills: 0 | Status: DISCONTINUED | COMMUNITY
Start: 2017-07-17 | End: 2018-10-22

## 2018-10-22 RX ORDER — CLOBETASOL PROPIONATE 0.5 MG/G
0.05 OINTMENT TOPICAL
Qty: 1 | Refills: 3 | Status: DISCONTINUED | COMMUNITY
Start: 2017-07-17 | End: 2018-10-22

## 2018-10-23 LAB
BUN SERPL-MCNC: 16 MG/DL
CREAT SERPL-MCNC: 0.96 MG/DL

## 2018-10-29 ENCOUNTER — FORM ENCOUNTER (OUTPATIENT)
Age: 68
End: 2018-10-29

## 2018-10-30 ENCOUNTER — APPOINTMENT (OUTPATIENT)
Dept: CT IMAGING | Facility: HOSPITAL | Age: 68
End: 2018-10-30

## 2018-10-30 ENCOUNTER — OUTPATIENT (OUTPATIENT)
Dept: OUTPATIENT SERVICES | Facility: HOSPITAL | Age: 68
LOS: 1 days | End: 2018-10-30
Payer: MEDICARE

## 2018-10-30 DIAGNOSIS — R22.1 LOCALIZED SWELLING, MASS AND LUMP, NECK: ICD-10-CM

## 2018-10-30 DIAGNOSIS — S52.532A COLLES' FRACTURE OF LEFT RADIUS, INITIAL ENCOUNTER FOR CLOSED FRACTURE: Chronic | ICD-10-CM

## 2018-10-30 DIAGNOSIS — Z98.89 OTHER SPECIFIED POSTPROCEDURAL STATES: Chronic | ICD-10-CM

## 2018-10-30 DIAGNOSIS — I77.9 DISORDER OF ARTERIES AND ARTERIOLES, UNSPECIFIED: ICD-10-CM

## 2018-10-30 DIAGNOSIS — C43.59 MALIGNANT MELANOMA OF OTHER PART OF TRUNK: Chronic | ICD-10-CM

## 2018-10-30 PROCEDURE — 70498 CT ANGIOGRAPHY NECK: CPT

## 2018-10-30 PROCEDURE — 70498 CT ANGIOGRAPHY NECK: CPT | Mod: 26

## 2018-10-31 ENCOUNTER — APPOINTMENT (OUTPATIENT)
Dept: NEUROLOGY | Facility: CLINIC | Age: 68
End: 2018-10-31
Payer: MEDICARE

## 2018-10-31 VITALS
DIASTOLIC BLOOD PRESSURE: 80 MMHG | HEART RATE: 73 BPM | SYSTOLIC BLOOD PRESSURE: 129 MMHG | WEIGHT: 245 LBS | HEIGHT: 72 IN | BODY MASS INDEX: 33.18 KG/M2

## 2018-10-31 DIAGNOSIS — G40.909 EPILEPSY, UNSPECIFIED, NOT INTRACTABLE, W/OUT STATUS EPILEPTICUS: ICD-10-CM

## 2018-10-31 PROCEDURE — 95819 EEG AWAKE AND ASLEEP: CPT

## 2018-10-31 PROCEDURE — 99214 OFFICE O/P EST MOD 30 MIN: CPT

## 2018-11-02 ENCOUNTER — OTHER (OUTPATIENT)
Age: 68
End: 2018-11-02

## 2018-11-20 ENCOUNTER — OUTPATIENT (OUTPATIENT)
Dept: OUTPATIENT SERVICES | Facility: HOSPITAL | Age: 68
LOS: 1 days | End: 2018-11-20
Payer: MEDICARE

## 2018-11-20 VITALS
RESPIRATION RATE: 20 BRPM | DIASTOLIC BLOOD PRESSURE: 89 MMHG | TEMPERATURE: 98 F | WEIGHT: 227.08 LBS | SYSTOLIC BLOOD PRESSURE: 132 MMHG | OXYGEN SATURATION: 98 % | HEART RATE: 70 BPM | HEIGHT: 78 IN

## 2018-11-20 DIAGNOSIS — S52.532A COLLES' FRACTURE OF LEFT RADIUS, INITIAL ENCOUNTER FOR CLOSED FRACTURE: Chronic | ICD-10-CM

## 2018-11-20 DIAGNOSIS — G47.33 OBSTRUCTIVE SLEEP APNEA (ADULT) (PEDIATRIC): ICD-10-CM

## 2018-11-20 DIAGNOSIS — C43.59 MALIGNANT MELANOMA OF OTHER PART OF TRUNK: Chronic | ICD-10-CM

## 2018-11-20 DIAGNOSIS — Z98.89 OTHER SPECIFIED POSTPROCEDURAL STATES: Chronic | ICD-10-CM

## 2018-11-20 DIAGNOSIS — I10 ESSENTIAL (PRIMARY) HYPERTENSION: ICD-10-CM

## 2018-11-20 DIAGNOSIS — Z98.890 OTHER SPECIFIED POSTPROCEDURAL STATES: Chronic | ICD-10-CM

## 2018-11-20 DIAGNOSIS — Z01.818 ENCOUNTER FOR OTHER PREPROCEDURAL EXAMINATION: ICD-10-CM

## 2018-11-20 DIAGNOSIS — I65.29 OCCLUSION AND STENOSIS OF UNSPECIFIED CAROTID ARTERY: ICD-10-CM

## 2018-11-20 DIAGNOSIS — I65.22 OCCLUSION AND STENOSIS OF LEFT CAROTID ARTERY: ICD-10-CM

## 2018-11-20 LAB
ANION GAP SERPL CALC-SCNC: 12 MMOL/L — SIGNIFICANT CHANGE UP (ref 5–17)
BLD GP AB SCN SERPL QL: NEGATIVE — SIGNIFICANT CHANGE UP
BUN SERPL-MCNC: 15 MG/DL — SIGNIFICANT CHANGE UP (ref 7–23)
CALCIUM SERPL-MCNC: 9.6 MG/DL — SIGNIFICANT CHANGE UP (ref 8.4–10.5)
CHLORIDE SERPL-SCNC: 107 MMOL/L — SIGNIFICANT CHANGE UP (ref 96–108)
CO2 SERPL-SCNC: 21 MMOL/L — LOW (ref 22–31)
CREAT SERPL-MCNC: 0.84 MG/DL — SIGNIFICANT CHANGE UP (ref 0.5–1.3)
GLUCOSE SERPL-MCNC: 107 MG/DL — HIGH (ref 70–99)
HCT VFR BLD CALC: 43.3 % — SIGNIFICANT CHANGE UP (ref 39–50)
HGB BLD-MCNC: 14.9 G/DL — SIGNIFICANT CHANGE UP (ref 13–17)
MCHC RBC-ENTMCNC: 31.1 PG — SIGNIFICANT CHANGE UP (ref 27–34)
MCHC RBC-ENTMCNC: 34.4 GM/DL — SIGNIFICANT CHANGE UP (ref 32–36)
MCV RBC AUTO: 90.4 FL — SIGNIFICANT CHANGE UP (ref 80–100)
PLATELET # BLD AUTO: 186 K/UL — SIGNIFICANT CHANGE UP (ref 150–400)
POTASSIUM SERPL-MCNC: 4.2 MMOL/L — SIGNIFICANT CHANGE UP (ref 3.5–5.3)
POTASSIUM SERPL-SCNC: 4.2 MMOL/L — SIGNIFICANT CHANGE UP (ref 3.5–5.3)
RBC # BLD: 4.79 M/UL — SIGNIFICANT CHANGE UP (ref 4.2–5.8)
RBC # FLD: 12.4 % — SIGNIFICANT CHANGE UP (ref 10.3–14.5)
RH IG SCN BLD-IMP: POSITIVE — SIGNIFICANT CHANGE UP
SODIUM SERPL-SCNC: 140 MMOL/L — SIGNIFICANT CHANGE UP (ref 135–145)
WBC # BLD: 6.08 K/UL — SIGNIFICANT CHANGE UP (ref 3.8–10.5)
WBC # FLD AUTO: 6.08 K/UL — SIGNIFICANT CHANGE UP (ref 3.8–10.5)

## 2018-11-20 PROCEDURE — G0463: CPT

## 2018-11-20 PROCEDURE — 85027 COMPLETE CBC AUTOMATED: CPT

## 2018-11-20 PROCEDURE — 86900 BLOOD TYPING SEROLOGIC ABO: CPT

## 2018-11-20 PROCEDURE — 86850 RBC ANTIBODY SCREEN: CPT

## 2018-11-20 PROCEDURE — 80048 BASIC METABOLIC PNL TOTAL CA: CPT

## 2018-11-20 PROCEDURE — 86901 BLOOD TYPING SEROLOGIC RH(D): CPT

## 2018-11-20 RX ORDER — LIDOCAINE HCL 20 MG/ML
0.2 VIAL (ML) INJECTION ONCE
Qty: 0 | Refills: 0 | Status: DISCONTINUED | OUTPATIENT
Start: 2018-12-03 | End: 2018-12-03

## 2018-11-20 RX ORDER — CEFAZOLIN SODIUM 1 G
2000 VIAL (EA) INJECTION ONCE
Qty: 0 | Refills: 0 | Status: COMPLETED | OUTPATIENT
Start: 2018-12-03 | End: 2018-12-03

## 2018-11-20 RX ORDER — SODIUM CHLORIDE 9 MG/ML
3 INJECTION INTRAMUSCULAR; INTRAVENOUS; SUBCUTANEOUS EVERY 8 HOURS
Qty: 0 | Refills: 0 | Status: DISCONTINUED | OUTPATIENT
Start: 2018-12-03 | End: 2018-12-03

## 2018-11-20 NOTE — H&P PST ADULT - PSH
Closed Colles' fracture of left radius  s/p ORIF 2015  H/O colonoscopy    History of craniotomy  2001 - meningioma - Dr Porras  Malignant melanoma of abdomen  s/p excision  S/P carotid endarterectomy  Left - 3/2017 ([facility])

## 2018-11-20 NOTE — H&P PST ADULT - PROBLEM SELECTOR PLAN 1
Left Carotid Endarterectomy on 12/3/18  Pre- Op instructions discussed   CBC,BMP,Type and Screen sent

## 2018-11-20 NOTE — H&P PST ADULT - HISTORY OF PRESENT ILLNESS
69 yo male with PMH of HTN,  HLD, left parietal meningioma s/p craniotomy for resection 2001, seizures (last 2011) with incidental finding of severe right carotid artery stenosis s/p right carotid endarterectomy on 3/13/2017. Presents to scheduled Left Carotid Endarterectomy on 12/3/2018. 69 yo male with PMH of HTN,  HLD, left parietal meningioma s/p craniotomy for resection 2001, seizures (last 2011) with incidental finding of severe right carotid artery stenosis s/p right carotid endarterectomy on 3/13/2017. Presents to scheduled Left Carotid Endarterectomy due to stenoses progression on 12/3/2018.

## 2018-11-20 NOTE — H&P PST ADULT - NEGATIVE CARDIOVASCULAR SYMPTOMS
no orthopnea/no peripheral edema/no dyspnea on exertion/no paroxysmal nocturnal dyspnea/no palpitations

## 2018-11-20 NOTE — H&P PST ADULT - PMH
Anxiety    Colles' fracture    ETOH abuse  past history, no ETOH since 1984  Fatty liver  dx 1995  H/O meningioma of the brain  s/p craniotomy for excision 2001  Hyperlipidemia    Malignant melanoma  abdomen - s/p excision [facility]  Obstructive sleep apnea  non-compliant with cpap  Occlusion of left carotid artery    Occlusion of right carotid artery    Seizure disorder  last seizure 2011

## 2018-11-27 ENCOUNTER — APPOINTMENT (OUTPATIENT)
Dept: INTERNAL MEDICINE | Facility: CLINIC | Age: 68
End: 2018-11-27
Payer: MEDICARE

## 2018-11-27 ENCOUNTER — NON-APPOINTMENT (OUTPATIENT)
Age: 68
End: 2018-11-27

## 2018-11-27 VITALS
OXYGEN SATURATION: 98 % | BODY MASS INDEX: 29.74 KG/M2 | HEART RATE: 71 BPM | WEIGHT: 222 LBS | TEMPERATURE: 98.6 F | DIASTOLIC BLOOD PRESSURE: 80 MMHG | HEIGHT: 72.25 IN | SYSTOLIC BLOOD PRESSURE: 122 MMHG

## 2018-11-27 DIAGNOSIS — Z87.898 PERSONAL HISTORY OF OTHER SPECIFIED CONDITIONS: ICD-10-CM

## 2018-11-27 DIAGNOSIS — Z87.891 PERSONAL HISTORY OF NICOTINE DEPENDENCE: ICD-10-CM

## 2018-11-27 DIAGNOSIS — Z85.820 PERSONAL HISTORY OF MALIGNANT MELANOMA OF SKIN: ICD-10-CM

## 2018-11-27 DIAGNOSIS — J30.2 OTHER SEASONAL ALLERGIC RHINITIS: ICD-10-CM

## 2018-11-27 DIAGNOSIS — L03.114 CELLULITIS OF LEFT UPPER LIMB: ICD-10-CM

## 2018-11-27 DIAGNOSIS — Z01.818 ENCOUNTER FOR OTHER PREPROCEDURAL EXAMINATION: ICD-10-CM

## 2018-11-27 DIAGNOSIS — F41.9 ANXIETY DISORDER, UNSPECIFIED: ICD-10-CM

## 2018-11-27 DIAGNOSIS — E55.9 VITAMIN D DEFICIENCY, UNSPECIFIED: ICD-10-CM

## 2018-11-27 PROCEDURE — 36415 COLL VENOUS BLD VENIPUNCTURE: CPT

## 2018-11-27 PROCEDURE — 93000 ELECTROCARDIOGRAM COMPLETE: CPT | Mod: 59

## 2018-11-27 PROCEDURE — 99214 OFFICE O/P EST MOD 30 MIN: CPT | Mod: 25

## 2018-11-27 PROCEDURE — G0439: CPT

## 2018-11-27 PROCEDURE — G0444 DEPRESSION SCREEN ANNUAL: CPT | Mod: 59

## 2018-11-27 NOTE — HISTORY OF PRESENT ILLNESS
[No Pertinent Cardiac History] : no history of aortic stenosis, atrial fibrillation, coronary artery disease, recent myocardial infarction, or implantable device/pacemaker [No Pertinent Pulmonary History] : no history of asthma, COPD, sleep apnea, or smoking [No Adverse Anesthesia Reaction] : no adverse anesthesia reaction in self or family member [(Patient denies any chest pain, claudication, dyspnea on exertion, orthopnea, palpitations or syncope)] : Patient denies any chest pain, claudication, dyspnea on exertion, orthopnea, palpitations or syncope [Excellent (>10 METs)] : Excellent (>10 METs) [Anti-Platelet Agents: _____] : Anti-Platelet Agents: [unfilled] [Chronic Anticoagulation] : no chronic anticoagulation [Chronic Kidney Disease] : no chronic kidney disease [Diabetes] : no diabetes [FreeTextEntry2] : 12/3/18 [FreeTextEntry3] : Dr. Hesham Brown at [facility].  [FreeTextEntry4] : 69 yo male comes to establish medical care. Previous PCP was Dr. Jeyson Randall but stopped taking his insurance. Previous neurologist was Dr. Saeid Vega. He is here for preoperative evaluation prior to planned left carotid endarterectomy on 12/3. CTA neck in 10/18 showed high grade stenosis of left common carotid artery.  [FreeTextEntry1] : New pt - CPE [de-identified] : 68 year M comes to establish medical care and for an annual exam. Previously followed by Dr. Jeyson Randall in [place], patient for 15 years, recently stopped taking his insurance.\par \par He is also here for preoperative evaluation prior to planned left carotid endarterectomy to be performed by Dr. Hesham Brown on 12/3/18. he has high grade stenosis of left common carotid artery. He had right carotid endarterectomy in 3/17. He had preoperative lab testing on 11/20.\par \par He has history of HTN on Amlodipine 2.5 mg qd. He takes Pravastatin for hyperlipidemia. He is followed by cardiologist, Dr. Driss Salomon. \par \par History of seizure disorder from previous meningioma, maintained on Topamax. No recent seizure for years. \par \par He received pneumococcal and influenza vaccines both on left upper arm 1 week ago at local pharmacy, then developed erythematous rash on arm. Went to urgent care on 11/24 and prescribed Bactrim for superimposed cellulitis.

## 2018-11-27 NOTE — HEALTH RISK ASSESSMENT
[Excellent] : ~his/her~  mood as  excellent [No falls in past year] : Patient reported no falls in the past year [0] : 2) Feeling down, depressed, or hopeless: Not at all (0) [Patient reported colonoscopy was normal] : Patient reported colonoscopy was normal [Employed] : employed [] :  [# Of Children ___] : has [unfilled] children [Fully functional (bathing, dressing, toileting, transferring, walking, feeding)] : Fully functional (bathing, dressing, toileting, transferring, walking, feeding) [Fully functional (using the telephone, shopping, preparing meals, housekeeping, doing laundry, using] : Fully functional and needs no help or supervision to perform IADLs (using the telephone, shopping, preparing meals, housekeeping, doing laundry, using transportation, managing medications and managing finances) [Smoke Detector] : smoke detector [Carbon Monoxide Detector] : carbon monoxide detector [Seat Belt] :  uses seat belt [Sunscreen] : uses sunscreen [Discussed at today's visit] : Advance Directives Discussed at today's visit [Designated Healthcare Proxy] : Designated healthcare proxy [Name: ___] : Health Care Proxy's Name: [unfilled]  [Relationship: ___] : Relationship: [unfilled] [] : No [Sexually Active] : not sexually active [High Risk Behavior] : no high risk behavior [Reports changes in hearing] : Reports no changes in hearing [Reports changes in vision] : Reports no changes in vision [Reports changes in dental health] : Reports no changes in dental health [ColonoscopyDate] : 03/18 [de-identified] : lives with mother

## 2018-11-27 NOTE — PHYSICAL EXAM
[No Acute Distress] : no acute distress [Well Nourished] : well nourished [Well Developed] : well developed [Well-Appearing] : well-appearing [Normal Sclera/Conjunctiva] : normal sclera/conjunctiva [PERRL] : pupils equal round and reactive to light [EOMI] : extraocular movements intact [Normal Outer Ear/Nose] : the outer ears and nose were normal in appearance [Normal Oropharynx] : the oropharynx was normal [Normal TMs] : both tympanic membranes were normal [No JVD] : no jugular venous distention [Supple] : supple [No Lymphadenopathy] : no lymphadenopathy [Thyroid Normal, No Nodules] : the thyroid was normal and there were no nodules present [No Respiratory Distress] : no respiratory distress  [Clear to Auscultation] : lungs were clear to auscultation bilaterally [No Accessory Muscle Use] : no accessory muscle use [Normal Rate] : normal rate  [Regular Rhythm] : with a regular rhythm [Normal S1, S2] : normal S1 and S2 [No Murmur] : no murmur heard [No Varicosities] : no varicosities [Pedal Pulses Present] : the pedal pulses are present [No Edema] : there was no peripheral edema [Soft] : abdomen soft [Non Tender] : non-tender [Non-distended] : non-distended [No Masses] : no abdominal mass palpated [No HSM] : no HSM [Normal Bowel Sounds] : normal bowel sounds [Normal Posterior Cervical Nodes] : no posterior cervical lymphadenopathy [Normal Anterior Cervical Nodes] : no anterior cervical lymphadenopathy [No CVA Tenderness] : no CVA  tenderness [No Spinal Tenderness] : no spinal tenderness [No Joint Swelling] : no joint swelling [Grossly Normal Strength/Tone] : grossly normal strength/tone [No Rash] : no rash [Normal Gait] : normal gait [Coordination Grossly Intact] : coordination grossly intact [No Focal Deficits] : no focal deficits [Deep Tendon Reflexes (DTR)] : deep tendon reflexes were 2+ and symmetric [Normal Affect] : the affect was normal [Normal Insight/Judgement] : insight and judgment were intact [Normal Voice/Communication] : normal voice/communication [No Carotid Bruits] : no carotid bruits [Normal Appearance] : normal in appearance [Penis Abnormality] : normal uncircumcised penis [Scrotum] : the scrotum was normal [Testes Tenderness] : no tenderness of the testes [Normal Mood] : the mood was normal [Acne] : no acne [de-identified] : surgical scar over right carotid [de-identified] : defer to GI/ [de-identified] : faded erythematous macular rash on left upper arm; scattered nevi on torso and back; tanned skin

## 2018-11-27 NOTE — HISTORY OF PRESENT ILLNESS
[No Pertinent Cardiac History] : no history of aortic stenosis, atrial fibrillation, coronary artery disease, recent myocardial infarction, or implantable device/pacemaker [No Pertinent Pulmonary History] : no history of asthma, COPD, sleep apnea, or smoking [No Adverse Anesthesia Reaction] : no adverse anesthesia reaction in self or family member [(Patient denies any chest pain, claudication, dyspnea on exertion, orthopnea, palpitations or syncope)] : Patient denies any chest pain, claudication, dyspnea on exertion, orthopnea, palpitations or syncope [Excellent (>10 METs)] : Excellent (>10 METs) [Anti-Platelet Agents: _____] : Anti-Platelet Agents: [unfilled] [Chronic Anticoagulation] : no chronic anticoagulation [Chronic Kidney Disease] : no chronic kidney disease [Diabetes] : no diabetes [FreeTextEntry2] : 12/3/18 [FreeTextEntry3] : Dr. Hesham Brown at [facility].  [FreeTextEntry4] : 67 yo male comes to establish medical care. Previous PCP was Dr. Jeyson Randall but stopped taking his insurance. Previous neurologist was Dr. Saeid Vega. He is here for preoperative evaluation prior to planned left carotid endarterectomy on 12/3. CTA neck in 10/18 showed high grade stenosis of left common carotid artery.  [FreeTextEntry1] : New pt - CPE [de-identified] : 68 year M comes to establish medical care and for an annual exam. Previously followed by Dr. Jeyson Randall in [place], patient for 15 years, recently stopped taking his insurance.\par \par He is also here for preoperative evaluation prior to planned left carotid endarterectomy to be performed by Dr. Hesham Brown on 12/3/18. he has high grade stenosis of left common carotid artery. He had right carotid endarterectomy in 3/17. He had preoperative lab testing on 11/20.\par \par He has history of HTN on Amlodipine 2.5 mg qd. He takes Pravastatin for hyperlipidemia. He is followed by cardiologist, Dr. Driss Salomon. \par \par History of seizure disorder from previous meningioma, maintained on Topamax. No recent seizure for years. \par \par He received pneumococcal and influenza vaccines both on left upper arm 1 week ago at local pharmacy, then developed erythematous rash on arm. Went to urgent care on 11/24 and prescribed Bactrim for superimposed cellulitis.

## 2018-11-27 NOTE — REVIEW OF SYSTEMS
[Negative] : Heme/Lymph [Impotence] : impotence [Joint Pain] : joint pain [Dysuria] : no dysuria [Poor Libido] : libido not poor

## 2018-11-27 NOTE — ASSESSMENT
[FreeTextEntry1] : 1. HCM: check routine labs as below. EKG is normal. Diet and exercise discussed - intentional weight loss, works out with cardio and sports (biking, golf). Immunizations discussed - received influenza vaccine and pneumococcal vaccine last week, last TDaP unknown. Colonoscopy 3/18 was normal. He is not sexually active at this time. Prostate cancer screening with PSA. Annual dermatology visit for TBE, hx of melanoma. \par \par 2. Patient scheduled for left carotid endarterectomy. He is an intermediate risk for an intermediate risk procedure. No medical contraindications for planned surgery. Routine blood work on 11/20 including CBC and CMP were unremarkable. EKG today is normal. He will stop his baby aspirin today, avoid NSAID's now and until date of surgery. He may take his typical prescription medications on morning of surgery with a sip of water. He may take Tylenol as needed for pain.\par \par 3. Hypertension: BP is controlled, on Norvasc 2.5 mg qd. Low salt diet.\par \par 4. Hyperlipidemia: check fasting lipid panel and chemistries. Continue Pravastatin. \par \par 5. BPH: continue Flomax. Check PSA. \par \par 6. He has erectile dysfunction and will start trial of Viagra. \par \par 7. He has seizure disorder maintained on Topamax. Seizure-free for years. Follows with neurologist Dr. Moeller. \par \par 8. Left upper arm cellulitis likely induced by receiving 2 vaccines in area. Clinically improving. Advised to complete course of Bactrim.  \par \par RTO in 6 months.

## 2018-11-27 NOTE — PHYSICAL EXAM
[No Acute Distress] : no acute distress [Well Nourished] : well nourished [Well Developed] : well developed [Well-Appearing] : well-appearing [Normal Sclera/Conjunctiva] : normal sclera/conjunctiva [PERRL] : pupils equal round and reactive to light [EOMI] : extraocular movements intact [Normal Outer Ear/Nose] : the outer ears and nose were normal in appearance [Normal Oropharynx] : the oropharynx was normal [Normal TMs] : both tympanic membranes were normal [No JVD] : no jugular venous distention [Supple] : supple [No Lymphadenopathy] : no lymphadenopathy [Thyroid Normal, No Nodules] : the thyroid was normal and there were no nodules present [No Respiratory Distress] : no respiratory distress  [Clear to Auscultation] : lungs were clear to auscultation bilaterally [No Accessory Muscle Use] : no accessory muscle use [Normal Rate] : normal rate  [Regular Rhythm] : with a regular rhythm [Normal S1, S2] : normal S1 and S2 [No Murmur] : no murmur heard [No Varicosities] : no varicosities [Pedal Pulses Present] : the pedal pulses are present [No Edema] : there was no peripheral edema [Soft] : abdomen soft [Non Tender] : non-tender [Non-distended] : non-distended [No Masses] : no abdominal mass palpated [No HSM] : no HSM [Normal Bowel Sounds] : normal bowel sounds [Normal Posterior Cervical Nodes] : no posterior cervical lymphadenopathy [Normal Anterior Cervical Nodes] : no anterior cervical lymphadenopathy [No CVA Tenderness] : no CVA  tenderness [No Spinal Tenderness] : no spinal tenderness [No Joint Swelling] : no joint swelling [Grossly Normal Strength/Tone] : grossly normal strength/tone [No Rash] : no rash [Normal Gait] : normal gait [Coordination Grossly Intact] : coordination grossly intact [No Focal Deficits] : no focal deficits [Deep Tendon Reflexes (DTR)] : deep tendon reflexes were 2+ and symmetric [Normal Affect] : the affect was normal [Normal Insight/Judgement] : insight and judgment were intact [Normal Voice/Communication] : normal voice/communication [No Carotid Bruits] : no carotid bruits [Normal Appearance] : normal in appearance [Penis Abnormality] : normal uncircumcised penis [Scrotum] : the scrotum was normal [Testes Tenderness] : no tenderness of the testes [Normal Mood] : the mood was normal [Acne] : no acne [de-identified] : surgical scar over right carotid [de-identified] : defer to GI/ [de-identified] : faded erythematous macular rash on left upper arm; scattered nevi on torso and back; tanned skin

## 2018-11-27 NOTE — HEALTH RISK ASSESSMENT
[Excellent] : ~his/her~  mood as  excellent [No falls in past year] : Patient reported no falls in the past year [0] : 2) Feeling down, depressed, or hopeless: Not at all (0) [Patient reported colonoscopy was normal] : Patient reported colonoscopy was normal [Employed] : employed [] :  [# Of Children ___] : has [unfilled] children [Fully functional (bathing, dressing, toileting, transferring, walking, feeding)] : Fully functional (bathing, dressing, toileting, transferring, walking, feeding) [Fully functional (using the telephone, shopping, preparing meals, housekeeping, doing laundry, using] : Fully functional and needs no help or supervision to perform IADLs (using the telephone, shopping, preparing meals, housekeeping, doing laundry, using transportation, managing medications and managing finances) [Smoke Detector] : smoke detector [Carbon Monoxide Detector] : carbon monoxide detector [Seat Belt] :  uses seat belt [Sunscreen] : uses sunscreen [Discussed at today's visit] : Advance Directives Discussed at today's visit [Designated Healthcare Proxy] : Designated healthcare proxy [Name: ___] : Health Care Proxy's Name: [unfilled]  [Relationship: ___] : Relationship: [unfilled] [] : No [Sexually Active] : not sexually active [High Risk Behavior] : no high risk behavior [Reports changes in hearing] : Reports no changes in hearing [Reports changes in vision] : Reports no changes in vision [Reports changes in dental health] : Reports no changes in dental health [ColonoscopyDate] : 03/18 [de-identified] : lives with mother

## 2018-11-28 ENCOUNTER — TRANSCRIPTION ENCOUNTER (OUTPATIENT)
Age: 68
End: 2018-11-28

## 2018-11-28 PROBLEM — E55.9 VITAMIN D DEFICIENCY: Status: ACTIVE | Noted: 2018-11-28

## 2018-11-28 LAB
25(OH)D3 SERPL-MCNC: 27.5 NG/ML
ALBUMIN SERPL ELPH-MCNC: 4.6 G/DL
ALP BLD-CCNC: 66 U/L
ALT SERPL-CCNC: 36 U/L
ANION GAP SERPL CALC-SCNC: 14 MMOL/L
APPEARANCE: CLEAR
AST SERPL-CCNC: 27 U/L
BACTERIA: NEGATIVE
BASOPHILS # BLD AUTO: 0.03 K/UL
BASOPHILS NFR BLD AUTO: 0.5 %
BILIRUB SERPL-MCNC: 0.5 MG/DL
BILIRUBIN URINE: NEGATIVE
BLOOD URINE: NEGATIVE
BUN SERPL-MCNC: 13 MG/DL
CALCIUM SERPL-MCNC: 9.9 MG/DL
CHLORIDE SERPL-SCNC: 107 MMOL/L
CHOLEST SERPL-MCNC: 180 MG/DL
CHOLEST/HDLC SERPL: 5.6 RATIO
CO2 SERPL-SCNC: 18 MMOL/L
COLOR: YELLOW
CREAT SERPL-MCNC: 1.17 MG/DL
EOSINOPHIL # BLD AUTO: 0.16 K/UL
EOSINOPHIL NFR BLD AUTO: 2.4 %
GLUCOSE QUALITATIVE U: NEGATIVE MG/DL
GLUCOSE SERPL-MCNC: 114 MG/DL
HBA1C MFR BLD HPLC: 5.6 %
HCT VFR BLD CALC: 41.6 %
HDLC SERPL-MCNC: 32 MG/DL
HGB BLD-MCNC: 14.4 G/DL
HYALINE CASTS: 4 /LPF
IMM GRANULOCYTES NFR BLD AUTO: 1.4 %
KETONES URINE: NEGATIVE
LDLC SERPL CALC-MCNC: 118 MG/DL
LEUKOCYTE ESTERASE URINE: NEGATIVE
LYMPHOCYTES # BLD AUTO: 2.26 K/UL
LYMPHOCYTES NFR BLD AUTO: 34.1 %
MAN DIFF?: NORMAL
MCHC RBC-ENTMCNC: 31 PG
MCHC RBC-ENTMCNC: 34.6 GM/DL
MCV RBC AUTO: 89.5 FL
MICROSCOPIC-UA: NORMAL
MONOCYTES # BLD AUTO: 0.6 K/UL
MONOCYTES NFR BLD AUTO: 9 %
NEUTROPHILS # BLD AUTO: 3.49 K/UL
NEUTROPHILS NFR BLD AUTO: 52.6 %
NITRITE URINE: NEGATIVE
PH URINE: 5.5
PLATELET # BLD AUTO: 225 K/UL
POTASSIUM SERPL-SCNC: 4.4 MMOL/L
PROT SERPL-MCNC: 8.5 G/DL
PROTEIN URINE: NEGATIVE MG/DL
PSA SERPL-MCNC: 0.96 NG/ML
RBC # BLD: 4.65 M/UL
RBC # FLD: 12.5 %
RED BLOOD CELLS URINE: 2 /HPF
SODIUM SERPL-SCNC: 139 MMOL/L
SPECIFIC GRAVITY URINE: 1.02
SQUAMOUS EPITHELIAL CELLS: 0 /HPF
T4 FREE SERPL-MCNC: 1.1 NG/DL
TRIGL SERPL-MCNC: 150 MG/DL
TSH SERPL-ACNC: 2.42 UIU/ML
UROBILINOGEN URINE: NEGATIVE MG/DL
WBC # FLD AUTO: 6.63 K/UL
WHITE BLOOD CELLS URINE: 1 /HPF

## 2018-11-28 RX ORDER — MULTIVIT-MIN/FOLIC/VIT K/LYCOP 400-300MCG
25 MCG TABLET ORAL DAILY
Qty: 30 | Refills: 0 | Status: ACTIVE | COMMUNITY
Start: 2018-11-28

## 2018-12-02 ENCOUNTER — TRANSCRIPTION ENCOUNTER (OUTPATIENT)
Age: 68
End: 2018-12-02

## 2018-12-02 VITALS — HEIGHT: 78 IN | WEIGHT: 227.08 LBS

## 2018-12-02 NOTE — PRE-ANESTHESIA EVALUATION ADULT - NSANTHPMHFT_GEN_ALL_CORE
67 yo male with PMH of HTN,  HLD, HAYLEY not complaint with CPAP, left parietal meningioma s/p craniotomy for resection 2001, seizures (last 2011) with incidental finding of severe right carotid artery stenosis s/p right carotid endarterectomy on 3/13/2017. Presents to scheduled Left Carotid Endarterectomy on 12/3/2018. 67 yo male with PMH of HTN,  HLD, HAYLEY not complaint with CPAP, left parietal meningioma s/p craniotomy for resection 2001, seizures (last 2011), TIA,right carotid artery stenosis s/p right carotid endarterectomy on 3/13/2017. Presents to scheduled Left Carotid Endarterectomy on 12/3/2018.

## 2018-12-02 NOTE — PRE-ANESTHESIA EVALUATION ADULT - NSRADCARDRESULTSFT_GEN_ALL_CORE
< from: CT Angio Neck w/ IV Cont (10.30.18 @ 11:48) >    IMPRESSION:    Calcified plaque and a high-grade stenosis involves the distal left   common carotid artery, just proximal to the carotid bifurcation,   measuring roughly 70% via NASCET criteria. The carotid stenosis has   substantially progressed compared to the 2017 neck CTA study, and appears   similar compared to the March 2018 MRA examination.    < end of copied text >

## 2018-12-03 ENCOUNTER — APPOINTMENT (OUTPATIENT)
Dept: NEUROSURGERY | Facility: CLINIC | Age: 68
End: 2018-12-03

## 2018-12-03 ENCOUNTER — INPATIENT (INPATIENT)
Facility: HOSPITAL | Age: 68
LOS: 1 days | Discharge: HOME | DRG: 39 | End: 2018-12-05
Attending: NEUROLOGICAL SURGERY | Admitting: NEUROLOGICAL SURGERY
Payer: MEDICARE

## 2018-12-03 ENCOUNTER — RESULT REVIEW (OUTPATIENT)
Age: 68
End: 2018-12-03

## 2018-12-03 ENCOUNTER — TRANSCRIPTION ENCOUNTER (OUTPATIENT)
Age: 68
End: 2018-12-03

## 2018-12-03 DIAGNOSIS — S52.532A COLLES' FRACTURE OF LEFT RADIUS, INITIAL ENCOUNTER FOR CLOSED FRACTURE: Chronic | ICD-10-CM

## 2018-12-03 DIAGNOSIS — I65.29 OCCLUSION AND STENOSIS OF UNSPECIFIED CAROTID ARTERY: ICD-10-CM

## 2018-12-03 DIAGNOSIS — Z98.89 OTHER SPECIFIED POSTPROCEDURAL STATES: Chronic | ICD-10-CM

## 2018-12-03 DIAGNOSIS — Z98.890 OTHER SPECIFIED POSTPROCEDURAL STATES: Chronic | ICD-10-CM

## 2018-12-03 DIAGNOSIS — C43.59 MALIGNANT MELANOMA OF OTHER PART OF TRUNK: Chronic | ICD-10-CM

## 2018-12-03 LAB
ANION GAP SERPL CALC-SCNC: 14 MMOL/L — SIGNIFICANT CHANGE UP (ref 5–17)
BUN SERPL-MCNC: 12 MG/DL — SIGNIFICANT CHANGE UP (ref 7–23)
CALCIUM SERPL-MCNC: 9.1 MG/DL — SIGNIFICANT CHANGE UP (ref 8.4–10.5)
CHLORIDE SERPL-SCNC: 106 MMOL/L — SIGNIFICANT CHANGE UP (ref 96–108)
CO2 SERPL-SCNC: 18 MMOL/L — LOW (ref 22–31)
CREAT SERPL-MCNC: 0.78 MG/DL — SIGNIFICANT CHANGE UP (ref 0.5–1.3)
GLUCOSE BLDC GLUCOMTR-MCNC: 149 MG/DL — HIGH (ref 70–99)
GLUCOSE SERPL-MCNC: 193 MG/DL — HIGH (ref 70–99)
HCT VFR BLD CALC: 39.2 % — SIGNIFICANT CHANGE UP (ref 39–50)
HGB BLD-MCNC: 14 G/DL — SIGNIFICANT CHANGE UP (ref 13–17)
INR BLD: 1.1 RATIO — SIGNIFICANT CHANGE UP (ref 0.88–1.16)
MAGNESIUM SERPL-MCNC: 2.3 MG/DL — SIGNIFICANT CHANGE UP (ref 1.6–2.6)
MCHC RBC-ENTMCNC: 31.9 PG — SIGNIFICANT CHANGE UP (ref 27–34)
MCHC RBC-ENTMCNC: 35.7 GM/DL — SIGNIFICANT CHANGE UP (ref 32–36)
MCV RBC AUTO: 89.5 FL — SIGNIFICANT CHANGE UP (ref 80–100)
PHOSPHATE SERPL-MCNC: 2.2 MG/DL — LOW (ref 2.5–4.5)
PLATELET # BLD AUTO: 197 K/UL — SIGNIFICANT CHANGE UP (ref 150–400)
POTASSIUM SERPL-MCNC: 3.9 MMOL/L — SIGNIFICANT CHANGE UP (ref 3.5–5.3)
POTASSIUM SERPL-SCNC: 3.9 MMOL/L — SIGNIFICANT CHANGE UP (ref 3.5–5.3)
PROTHROM AB SERPL-ACNC: 12.7 SEC — SIGNIFICANT CHANGE UP (ref 10–12.9)
RBC # BLD: 4.38 M/UL — SIGNIFICANT CHANGE UP (ref 4.2–5.8)
RBC # FLD: 11.4 % — SIGNIFICANT CHANGE UP (ref 10.3–14.5)
SODIUM SERPL-SCNC: 138 MMOL/L — SIGNIFICANT CHANGE UP (ref 135–145)
WBC # BLD: 13.2 K/UL — HIGH (ref 3.8–10.5)
WBC # FLD AUTO: 13.2 K/UL — HIGH (ref 3.8–10.5)

## 2018-12-03 PROCEDURE — 88304 TISSUE EXAM BY PATHOLOGIST: CPT | Mod: 26

## 2018-12-03 PROCEDURE — 35301 RECHANNELING OF ARTERY: CPT | Mod: LT

## 2018-12-03 PROCEDURE — 99291 CRITICAL CARE FIRST HOUR: CPT

## 2018-12-03 PROCEDURE — 92240 ICG ANGIOGRAPHY I&R UNI/BI: CPT | Mod: 26

## 2018-12-03 RX ORDER — INSULIN LISPRO 100/ML
VIAL (ML) SUBCUTANEOUS
Qty: 0 | Refills: 0 | Status: DISCONTINUED | OUTPATIENT
Start: 2018-12-03 | End: 2018-12-05

## 2018-12-03 RX ORDER — ASPIRIN/CALCIUM CARB/MAGNESIUM 324 MG
81 TABLET ORAL DAILY
Qty: 0 | Refills: 0 | Status: DISCONTINUED | OUTPATIENT
Start: 2018-12-04 | End: 2018-12-05

## 2018-12-03 RX ORDER — SIMVASTATIN 20 MG/1
40 TABLET, FILM COATED ORAL AT BEDTIME
Qty: 0 | Refills: 0 | Status: DISCONTINUED | OUTPATIENT
Start: 2018-12-03 | End: 2018-12-05

## 2018-12-03 RX ORDER — DOCUSATE SODIUM 100 MG
100 CAPSULE ORAL THREE TIMES A DAY
Qty: 0 | Refills: 0 | Status: DISCONTINUED | OUTPATIENT
Start: 2018-12-03 | End: 2018-12-05

## 2018-12-03 RX ORDER — FENTANYL CITRATE 50 UG/ML
25 INJECTION INTRAVENOUS ONCE
Qty: 0 | Refills: 0 | Status: DISCONTINUED | OUTPATIENT
Start: 2018-12-03 | End: 2018-12-03

## 2018-12-03 RX ORDER — SENNA PLUS 8.6 MG/1
2 TABLET ORAL AT BEDTIME
Qty: 0 | Refills: 0 | Status: DISCONTINUED | OUTPATIENT
Start: 2018-12-03 | End: 2018-12-05

## 2018-12-03 RX ORDER — AMLODIPINE BESYLATE 2.5 MG/1
2.5 TABLET ORAL DAILY
Qty: 0 | Refills: 0 | Status: DISCONTINUED | OUTPATIENT
Start: 2018-12-03 | End: 2018-12-04

## 2018-12-03 RX ORDER — PANTOPRAZOLE SODIUM 20 MG/1
40 TABLET, DELAYED RELEASE ORAL
Qty: 0 | Refills: 0 | Status: DISCONTINUED | OUTPATIENT
Start: 2018-12-03 | End: 2018-12-04

## 2018-12-03 RX ORDER — TOPIRAMATE 25 MG
100 TABLET ORAL
Qty: 0 | Refills: 0 | Status: DISCONTINUED | OUTPATIENT
Start: 2018-12-03 | End: 2018-12-05

## 2018-12-03 RX ORDER — DEXTROSE MONOHYDRATE, SODIUM CHLORIDE, AND POTASSIUM CHLORIDE 50; .745; 4.5 G/1000ML; G/1000ML; G/1000ML
1000 INJECTION, SOLUTION INTRAVENOUS
Qty: 0 | Refills: 0 | Status: DISCONTINUED | OUTPATIENT
Start: 2018-12-03 | End: 2018-12-03

## 2018-12-03 RX ORDER — CEFAZOLIN SODIUM 1 G
1000 VIAL (EA) INJECTION EVERY 8 HOURS
Qty: 0 | Refills: 0 | Status: COMPLETED | OUTPATIENT
Start: 2018-12-03 | End: 2018-12-04

## 2018-12-03 RX ORDER — POTASSIUM PHOSPHATE, MONOBASIC POTASSIUM PHOSPHATE, DIBASIC 236; 224 MG/ML; MG/ML
15 INJECTION, SOLUTION INTRAVENOUS ONCE
Qty: 0 | Refills: 0 | Status: COMPLETED | OUTPATIENT
Start: 2018-12-03 | End: 2018-12-04

## 2018-12-03 RX ORDER — ONDANSETRON 8 MG/1
4 TABLET, FILM COATED ORAL EVERY 6 HOURS
Qty: 0 | Refills: 0 | Status: DISCONTINUED | OUTPATIENT
Start: 2018-12-03 | End: 2018-12-05

## 2018-12-03 RX ORDER — ACETAMINOPHEN 500 MG
650 TABLET ORAL EVERY 6 HOURS
Qty: 0 | Refills: 0 | Status: DISCONTINUED | OUTPATIENT
Start: 2018-12-03 | End: 2018-12-05

## 2018-12-03 RX ORDER — ASPIRIN/CALCIUM CARB/MAGNESIUM 324 MG
325 TABLET ORAL ONCE
Qty: 0 | Refills: 0 | Status: COMPLETED | OUTPATIENT
Start: 2018-12-03 | End: 2018-12-03

## 2018-12-03 RX ORDER — ALPRAZOLAM 0.25 MG
0.5 TABLET ORAL THREE TIMES A DAY
Qty: 0 | Refills: 0 | Status: DISCONTINUED | OUTPATIENT
Start: 2018-12-03 | End: 2018-12-05

## 2018-12-03 RX ADMIN — Medication 100 MILLIGRAM(S): at 13:16

## 2018-12-03 RX ADMIN — Medication 0.5 MILLIGRAM(S): at 17:54

## 2018-12-03 RX ADMIN — FENTANYL CITRATE 25 MICROGRAM(S): 50 INJECTION INTRAVENOUS at 14:00

## 2018-12-03 RX ADMIN — Medication 100 MILLIGRAM(S): at 21:42

## 2018-12-03 RX ADMIN — Medication 325 MILLIGRAM(S): at 13:06

## 2018-12-03 RX ADMIN — SIMVASTATIN 40 MILLIGRAM(S): 20 TABLET, FILM COATED ORAL at 21:42

## 2018-12-03 RX ADMIN — Medication 0.5 MILLIGRAM(S): at 21:41

## 2018-12-03 RX ADMIN — Medication 0.5 MILLIGRAM(S): at 23:55

## 2018-12-03 RX ADMIN — DEXTROSE MONOHYDRATE, SODIUM CHLORIDE, AND POTASSIUM CHLORIDE 75 MILLILITER(S): 50; .745; 4.5 INJECTION, SOLUTION INTRAVENOUS at 17:02

## 2018-12-03 RX ADMIN — Medication 100 MILLIGRAM(S): at 17:01

## 2018-12-03 RX ADMIN — FENTANYL CITRATE 25 MICROGRAM(S): 50 INJECTION INTRAVENOUS at 14:15

## 2018-12-03 NOTE — DISCHARGE NOTE ADULT - HOSPITAL COURSE
Patient is a 68 year old male with a past medical history of of HTN, HLD, left parietal meningioma s/p craniotomy for resection 2001, seizures (last 2011) with incidental finding of severe bilateral carotid artery stenosis s/p right carotid endarterectomy on 3/13/2017. He was admitted on 12/3/18 through same day admitting for scheduled left carotid endarterectomy due to stenoses progression. Post operative CTA head revealed expected post operative changes. Hospital course notable for elevated blood pressure for which Norvasc was increased from 2.5mg daily to 5mg daily. Otherwise hospital course uncomplicated. He was evaluated by physical therapy who recommended no needs upon discharge. On the day of discharge, he is medically and neurosurgically stable and cleared for discharge home.

## 2018-12-03 NOTE — DISCHARGE NOTE ADULT - CARE PROVIDER_API CALL
Hesham Brown), Neurosurgery  General  [address] [place] [address] Drive  [address] [place] [address]  Phone: [phone]  Fax: [phone]

## 2018-12-03 NOTE — DISCHARGE NOTE ADULT - CARE PLAN
Principal Discharge DX:	Occlusion of left carotid artery  Goal:	s/p left carotid endarterectomy  Assessment and plan of treatment:	.  .  Please make an appointment for follow up with neurosurgeon Dr. Hesham Brown's office in 1-2 weeks for wound check. Call [phone] to schedule an appointment. Sutures will be removed at follow up appointment. Keep incision site clean and dry. No creams, lotions, or ointments to incision area. Ok to shower but do not allow water to hit incision site directly. Gently pat dry after shower.   .  NO heavy lifting, strenuous activity, twisting, bending, driving, or working until cleared by your physician.   Return to ER immediately for any of the following: fever, bleeding, new onset numbness/tingling/weakness, nausea and/or vomiting, chest pain, shortness of breath, confusion, seizure, altered mental status, urinary and/or fecal incontinence or retention.  Secondary Diagnosis:	Anxiety  Assessment and plan of treatment:	Please make an appointment for follow up with your primary care physician after discharge. Continue Xanax as needed.  Secondary Diagnosis:	Essential hypertension  Assessment and plan of treatment:	.  .  Please make an appointment for follow up with your primary care physician after discharge. Your dose of Norvasc was increased to 5mg daily during this hospitalization.  Secondary Diagnosis:	ETOH abuse  Assessment and plan of treatment:	.  .  Please make an appointment for follow up with your primary care physician after discharge.  Secondary Diagnosis:	Hyperlipidemia  Assessment and plan of treatment:	.  .  Please make an appointment for follow up with your primary care physician after discharge.  Secondary Diagnosis:	Obstructive sleep apnea  Assessment and plan of treatment:	.  .  Please make an appointment for follow up with your primary care physician after discharge.  Secondary Diagnosis:	Seizure disorder

## 2018-12-03 NOTE — DISCHARGE NOTE ADULT - MEDICATION SUMMARY - MEDICATIONS TO CHANGE
I will SWITCH the dose or number of times a day I take the medications listed below when I get home from the hospital:    amLODIPine 2.5 mg oral tablet  -- 1 tab(s) by mouth once a day

## 2018-12-03 NOTE — PROGRESS NOTE ADULT - SUBJECTIVE AND OBJECTIVE BOX
HPI:  67 yo male with PMH of HTN,  HLD, left parietal meningioma s/p craniotomy for resection 2001, seizures (last 2011) with incidental finding of severe right carotid artery stenosis s/p right carotid endarterectomy on 3/13/2017. Presents to scheduled Left Carotid Endarterectomy due to stenoses progression on 12/3/2018. (20 Nov 2018 07:24)    On admission, the patient was:  GCS:  [place]:  modified Cast:  ICH score:  NIHSS:    *** HIGH RISK OF DVT PRESENT ON ADMISSION ***    VITALS:  T(C): , Max: 36.7 (12-03-18 @ 12:35)  HR:  (74 - 101)  BP:  (102/69 - 126/82)  ABP:  (111/55 - 132/62)  RR:  (12 - 23)  SpO2:  (96% - 99%)  Wt(kg): --      12-03-18 @ 07:01  -  12-03-18 @ 14:46  --------------------------------------------------------  IN: 75 mL / OUT: 150 mL / NET: -75 mL      LABS:  Na:   K:   Cl:   CO2:   BUN:   Cr:   Glu:     Hgb:   Hct:   WBC:   Plt:   PT: 12.7 (12-03 @ 07:15)  INR: 1.10 (12-03 @ 07:15)  aPTT: -- (12-03 @ 07:15)    IMAGING:   Recent imaging studies were reviewed.    MEDICATIONS:  acetaminophen   Tablet .. 650 milliGRAM(s) Oral every 6 hours PRN  ALPRAZolam 0.5 milliGRAM(s) Oral three times a day PRN  amLODIPine   Tablet 2.5 milliGRAM(s) Oral daily  ceFAZolin   IVPB 1000 milliGRAM(s) IV Intermittent every 8 hours  docusate sodium 100 milliGRAM(s) Oral three times a day  lidocaine 1% Injectable 0.2 milliLiter(s) Local Injection once  ondansetron Injectable 4 milliGRAM(s) IV Push every 6 hours PRN  pantoprazole    Tablet 40 milliGRAM(s) Oral before breakfast  senna 2 Tablet(s) Oral at bedtime PRN  simvastatin 40 milliGRAM(s) Oral at bedtime  sodium chloride 0.9% with potassium chloride 20 mEq/L 1000 milliLiter(s) IV Continuous <Continuous>  topiramate 100 milliGRAM(s) Oral two times a day    EXAMINATION:  General:  calm  HEENT:  MMM  Neuro:  awake, alert, oriented x 3, follows commands, moves all extremities  Cards:  RRR  Respiratory:  no respiratory distress  Adomen:  soft  Extremities:  no edema  Skin:  warm/dry HPI: 67 yo male with PMH of HTN,  HLD, left parietal meningioma s/p craniotomy for resection 2001, seizures (last 2011) with incidental finding of severe right carotid artery stenosis s/p right carotid endarterectomy on 3/13/2017. Presents to scheduled Left Carotid Endarterectomy due to stenoses progression on 12/3/2018. (20 Nov 2018 07:24)    On admission, the patient was:  GCS: 15    VITALS:  T(C): , Max: 36.7 (12-03-18 @ 12:35)  HR:  (74 - 101)  BP:  (102/69 - 126/82)  ABP:  (111/55 - 132/62)  RR:  (12 - 23)  SpO2:  (96% - 99%)  Wt(kg): --      12-03-18 @ 07:01  -  12-03-18 @ 14:46  --------------------------------------------------------  IN: 75 mL / OUT: 150 mL / NET: -75 mL      LABS:  Na:   K:   Cl:   CO2:   BUN:   Cr:   Glu:     Hgb:   Hct:   WBC:   Plt:   PT: 12.7 (12-03 @ 07:15)  INR: 1.10 (12-03 @ 07:15)  aPTT: -- (12-03 @ 07:15)    IMAGING:   Recent imaging studies were reviewed.    MEDICATIONS:  acetaminophen   Tablet .. 650 milliGRAM(s) Oral every 6 hours PRN  ALPRAZolam 0.5 milliGRAM(s) Oral three times a day PRN  amLODIPine   Tablet 2.5 milliGRAM(s) Oral daily  ceFAZolin   IVPB 1000 milliGRAM(s) IV Intermittent every 8 hours  docusate sodium 100 milliGRAM(s) Oral three times a day  lidocaine 1% Injectable 0.2 milliLiter(s) Local Injection once  ondansetron Injectable 4 milliGRAM(s) IV Push every 6 hours PRN  pantoprazole    Tablet 40 milliGRAM(s) Oral before breakfast  senna 2 Tablet(s) Oral at bedtime PRN  simvastatin 40 milliGRAM(s) Oral at bedtime  sodium chloride 0.9% with potassium chloride 20 mEq/L 1000 milliLiter(s) IV Continuous <Continuous>  topiramate 100 milliGRAM(s) Oral two times a day    EXAMINATION:  General:  calm  HEENT:  MMM  Neuro:  awake, alert, oriented x 3, follows commands, moves all extremities 5/5. Face symmetrical.   Cards:  RRR  Respiratory:  no respiratory distress  Adomen:  soft  Extremities:  no edema  Skin:  warm/dry HPI: 69 yo male with PMH of HTN,  HLD, left parietal meningioma s/p craniotomy for resection 2001, seizures (last 2011) with incidental finding of severe right carotid artery stenosis s/p right carotid endarterectomy on 3/13/2017. Presents to scheduled Left Carotid Endarterectomy due to stenoses progression on 12/3/2018. (20 Nov 2018 07:24)    On admission, the patient was:  GCS: 15    VITALS:  T(C): , Max: 36.7 (12-03-18 @ 12:35)  HR:  (74 - 101)  BP:  (102/69 - 126/82)  ABP:  (111/55 - 132/62)  RR:  (12 - 23)  SpO2:  (96% - 99%)  Wt(kg): --      12-03-18 @ 07:01  -  12-03-18 @ 14:46  --------------------------------------------------------  IN: 75 mL / OUT: 150 mL / NET: -75 mL      LABS:  Na:   K:   Cl:   CO2:   BUN:   Cr:   Glu:     Hgb:   Hct:   WBC:   Plt:   PT: 12.7 (12-03 @ 07:15)  INR: 1.10 (12-03 @ 07:15)  aPTT: -- (12-03 @ 07:15)    IMAGING:   Recent imaging studies were reviewed.    MEDICATIONS:  acetaminophen   Tablet .. 650 milliGRAM(s) Oral every 6 hours PRN  ALPRAZolam 0.5 milliGRAM(s) Oral three times a day PRN  amLODIPine   Tablet 2.5 milliGRAM(s) Oral daily  ceFAZolin   IVPB 1000 milliGRAM(s) IV Intermittent every 8 hours  docusate sodium 100 milliGRAM(s) Oral three times a day  lidocaine 1% Injectable 0.2 milliLiter(s) Local Injection once  ondansetron Injectable 4 milliGRAM(s) IV Push every 6 hours PRN  pantoprazole    Tablet 40 milliGRAM(s) Oral before breakfast  senna 2 Tablet(s) Oral at bedtime PRN  simvastatin 40 milliGRAM(s) Oral at bedtime  sodium chloride 0.9% with potassium chloride 20 mEq/L 1000 milliLiter(s) IV Continuous <Continuous>  topiramate 100 milliGRAM(s) Oral two times a day    EXAMINATION:  General:  calm  HEENT:  MMM, neck incision c/d/i  Neuro:  awake, alert, oriented x 3, follows commands, moves all extremities 5/5. Face symmetrical.   Cards:  RRR  Respiratory:  no respiratory distress  Abdomen:  soft  Extremities:  no edema  Skin:  warm/dry

## 2018-12-03 NOTE — DISCHARGE NOTE ADULT - PLAN OF CARE
.  .  Please make an appointment for follow up with your primary care physician after discharge. Your dose of Norvasc was increased to 5mg daily during this hospitalization. .  .  Please make an appointment for follow up with your primary care physician after discharge. s/p left carotid endarterectomy .  .  Please make an appointment for follow up with neurosurgeon Dr. Hesham Brown's office in 1-2 weeks for wound check. Call [phone] to schedule an appointment. Sutures will be removed at follow up appointment. Keep incision site clean and dry. No creams, lotions, or ointments to incision area. Ok to shower but do not allow water to hit incision site directly. Gently pat dry after shower.   .  NO heavy lifting, strenuous activity, twisting, bending, driving, or working until cleared by your physician.   Return to ER immediately for any of the following: fever, bleeding, new onset numbness/tingling/weakness, nausea and/or vomiting, chest pain, shortness of breath, confusion, seizure, altered mental status, urinary and/or fecal incontinence or retention. Please make an appointment for follow up with your primary care physician after discharge. Continue Xanax as needed.

## 2018-12-03 NOTE — DISCHARGE NOTE ADULT - PATIENT PORTAL LINK FT
You can access the [facility] Patient Portal, offered by [facility], by registering with the following website: [email or web address]

## 2018-12-03 NOTE — PROGRESS NOTE ADULT - ASSESSMENT
Summary:     NEURO:  q1h neuro checks    CARDS:  -150    PULM:  sat > 92%    RENAL:  IVL    GASTRO:  advance as tolerated  ---> Stress ulcer prophylaxis:  PPI    HEME:  monitor H/H    ---> DVT prophylaxis: SCDs, hold anticoagulation, fresh    ENDO:  euglycemia    ID:  afebrile    Code status:  Full code  Disposition:  ICU    This patient was at high risk of neurologic deterioration and/or death due to:     Time spent:  45 minutes Summary: 69 yo male with PMH notable for HLD, hx of left parietal meningioma s/p craniotomy for resection 2001, seizures (last 2011), s/p right carotid endarterectomy on 3/13/2017, who presented for elective L Carotid Endarterectomy 2/2 to worsening carotid stenosis.    NEURO:  q1h neuro checks  CTA tomorrow AM  Received  today  Will start ASA 81 mg po daily  Hx of seizures - No seizures since 2011; continued on Topamax 100 BID  Hx of anxiety - Continued on home med of Alprazolam prn    CARDS:  -160  HTN - Continued on amlodipine 2.5 daily  HLD - Continued on simvastatin 40 qHS    PULM:  sat > 92%  Incentive spirometry    RENAL:  On NS w/ KCl @ 75 ml; IVL once good po intake    GASTRO:  advance as tolerated  Bowel regimen  ---> Stress ulcer prophylaxis:  PPI    HEME:    ---> DVT prophylaxis: SCDs, hold anticoagulation since fresh post-op    ENDO:  euglycemia    ID:  Monitor for fevers    Code status:  Full code  Disposition:  ICU    This patient was at high risk of neurologic deterioration and/or death due to: brain hemorrhage; CVA    Time spent:  45 minutes

## 2018-12-03 NOTE — DISCHARGE NOTE ADULT - NS MD DC FALL RISK RISK
For information on Fall & Injury Prevention, visit [email or web address]
External genitalia is normal.

## 2018-12-03 NOTE — BRIEF OPERATIVE NOTE - PROCEDURE
<<-----Click on this checkbox to enter Procedure Carotid endarterectomy  12/03/2018    Active  [id number]

## 2018-12-03 NOTE — DISCHARGE NOTE ADULT - NS AS DC STROKE ED MATERIALS
Prescribed Medications/Call 911 for Stroke/Risk Factors for Stroke/Need for Followup After Discharge/Stroke Warning Signs and Symptoms/Stroke Education Booklet Stroke Education Booklet/Risk Factors for Stroke/Prescribed Medications/Need for Followup After Discharge/Stroke Warning Signs and Symptoms/Call 911 for Stroke

## 2018-12-03 NOTE — PATIENT PROFILE ADULT - VISION (WITH CORRECTIVE LENSES IF THE PATIENT USUALLY WEARS THEM):
left Partially impaired: cannot see medication labels or newsprint, but can see obstacles in path, and the surrounding layout; can count fingers at arm's length

## 2018-12-03 NOTE — DISCHARGE NOTE ADULT - NS AS DC FOLLOWUP STROKE INST
Stroke (includes: TIA/SAH/ICH/Ischemic Stroke)/Influenza vaccination (VIS Pub Date: August 7, 2015) Influenza vaccination (VIS Pub Date: August 7, 2015) Stroke (includes: TIA/SAH/ICH/Ischemic Stroke)/Smoking Cessation/Influenza vaccination (VIS Pub Date: August 7, 2015)

## 2018-12-03 NOTE — DISCHARGE NOTE ADULT - MEDICATION SUMMARY - MEDICATIONS TO TAKE
I will START or STAY ON the medications listed below when I get home from the hospital:    aspirin 81 mg oral tablet, chewable  -- 1 tab(s) by mouth once a day  -- Indication: For S/P carotid endarterectomy    acetaminophen 325 mg oral tablet  -- 2 tab(s) by mouth every 6 hours, As needed, Mild Pain (1 - 3)  -- Indication: For mild pain    oxyCODONE 5 mg oral tablet  -- 1 tab(s) by mouth every 6 hours, As needed, Moderate Pain (4 - 6) MDD:4 tabs  -- Indication: For moderate pain    topiramate 100 mg oral tablet  -- 1 tab(s) by mouth 2 times a day  -- Indication: For Seizures    simvastatin 40 mg oral tablet  -- 1 tab(s) by mouth once a day (at bedtime)  -- Indication: For Hyperlipidemia    ALPRAZolam 0.5 mg oral tablet  -- 1 tab(s) by mouth 3 times a day, As Needed -for anxiety MDD:3  -- Avoid grapefruit and grapefruit juice while taking this medication.  Caution federal law prohibits the transfer of this drug to any person other  than the person for whom it was prescribed.  Do not take this drug if you are pregnant.  May cause drowsiness.  Alcohol may intensify this effect.  Use care when operating dangerous machinery.    -- Indication: For Anxiety    amLODIPine 5 mg oral tablet  -- 1 tab(s) by mouth once a day  -- Indication: For Hypertension    senna oral tablet  -- 2 tab(s) by mouth once a day (at bedtime), As needed, Constipation  -- Indication: For constipation    docusate sodium 100 mg oral capsule  -- 1 cap(s) by mouth 3 times a day  -- Indication: For constipation

## 2018-12-04 LAB
GLUCOSE BLDC GLUCOMTR-MCNC: 111 MG/DL — HIGH (ref 70–99)
GLUCOSE BLDC GLUCOMTR-MCNC: 114 MG/DL — HIGH (ref 70–99)
GLUCOSE BLDC GLUCOMTR-MCNC: 136 MG/DL — HIGH (ref 70–99)
GLUCOSE BLDC GLUCOMTR-MCNC: 138 MG/DL — HIGH (ref 70–99)

## 2018-12-04 PROCEDURE — 70496 CT ANGIOGRAPHY HEAD: CPT | Mod: 26

## 2018-12-04 PROCEDURE — 99233 SBSQ HOSP IP/OBS HIGH 50: CPT

## 2018-12-04 PROCEDURE — 70498 CT ANGIOGRAPHY NECK: CPT | Mod: 26

## 2018-12-04 RX ORDER — OXYCODONE HYDROCHLORIDE 5 MG/1
5 TABLET ORAL EVERY 6 HOURS
Qty: 0 | Refills: 0 | Status: DISCONTINUED | OUTPATIENT
Start: 2018-12-04 | End: 2018-12-05

## 2018-12-04 RX ORDER — FAMOTIDINE 10 MG/ML
20 INJECTION INTRAVENOUS ONCE
Qty: 0 | Refills: 0 | Status: COMPLETED | OUTPATIENT
Start: 2018-12-04 | End: 2018-12-04

## 2018-12-04 RX ORDER — AMLODIPINE BESYLATE 2.5 MG/1
5 TABLET ORAL DAILY
Qty: 0 | Refills: 0 | Status: DISCONTINUED | OUTPATIENT
Start: 2018-12-04 | End: 2018-12-05

## 2018-12-04 RX ORDER — ENOXAPARIN SODIUM 100 MG/ML
40 INJECTION SUBCUTANEOUS
Qty: 0 | Refills: 0 | Status: DISCONTINUED | OUTPATIENT
Start: 2018-12-04 | End: 2018-12-05

## 2018-12-04 RX ORDER — CHLORHEXIDINE GLUCONATE 213 G/1000ML
1 SOLUTION TOPICAL
Qty: 0 | Refills: 0 | Status: DISCONTINUED | OUTPATIENT
Start: 2018-12-04 | End: 2018-12-05

## 2018-12-04 RX ORDER — AMLODIPINE BESYLATE 2.5 MG/1
2.5 TABLET ORAL ONCE
Qty: 0 | Refills: 0 | Status: COMPLETED | OUTPATIENT
Start: 2018-12-04 | End: 2018-12-04

## 2018-12-04 RX ADMIN — AMLODIPINE BESYLATE 2.5 MILLIGRAM(S): 2.5 TABLET ORAL at 10:00

## 2018-12-04 RX ADMIN — CHLORHEXIDINE GLUCONATE 1 APPLICATION(S): 213 SOLUTION TOPICAL at 21:48

## 2018-12-04 RX ADMIN — Medication 100 MILLIGRAM(S): at 05:01

## 2018-12-04 RX ADMIN — OXYCODONE HYDROCHLORIDE 5 MILLIGRAM(S): 5 TABLET ORAL at 00:45

## 2018-12-04 RX ADMIN — Medication 100 MILLIGRAM(S): at 17:00

## 2018-12-04 RX ADMIN — FAMOTIDINE 20 MILLIGRAM(S): 10 INJECTION INTRAVENOUS at 00:15

## 2018-12-04 RX ADMIN — ENOXAPARIN SODIUM 40 MILLIGRAM(S): 100 INJECTION SUBCUTANEOUS at 17:00

## 2018-12-04 RX ADMIN — OXYCODONE HYDROCHLORIDE 5 MILLIGRAM(S): 5 TABLET ORAL at 00:15

## 2018-12-04 RX ADMIN — SIMVASTATIN 40 MILLIGRAM(S): 20 TABLET, FILM COATED ORAL at 21:43

## 2018-12-04 RX ADMIN — ONDANSETRON 4 MILLIGRAM(S): 8 TABLET, FILM COATED ORAL at 17:00

## 2018-12-04 RX ADMIN — Medication 100 MILLIGRAM(S): at 13:11

## 2018-12-04 RX ADMIN — Medication 100 MILLIGRAM(S): at 21:43

## 2018-12-04 RX ADMIN — AMLODIPINE BESYLATE 2.5 MILLIGRAM(S): 2.5 TABLET ORAL at 05:02

## 2018-12-04 RX ADMIN — Medication 0.5 MILLIGRAM(S): at 21:43

## 2018-12-04 RX ADMIN — POTASSIUM PHOSPHATE, MONOBASIC POTASSIUM PHOSPHATE, DIBASIC 62.5 MILLIMOLE(S): 236; 224 INJECTION, SOLUTION INTRAVENOUS at 00:15

## 2018-12-04 RX ADMIN — Medication 81 MILLIGRAM(S): at 12:49

## 2018-12-04 RX ADMIN — Medication 100 MILLIGRAM(S): at 05:02

## 2018-12-04 RX ADMIN — PANTOPRAZOLE SODIUM 40 MILLIGRAM(S): 20 TABLET, DELAYED RELEASE ORAL at 08:18

## 2018-12-04 RX ADMIN — AMLODIPINE BESYLATE 5 MILLIGRAM(S): 2.5 TABLET ORAL at 21:43

## 2018-12-04 RX ADMIN — Medication 0.5 MILLIGRAM(S): at 05:01

## 2018-12-04 NOTE — PROGRESS NOTE ADULT - SUBJECTIVE AND OBJECTIVE BOX
Vital Signs Last 24 Hrs  T(C): 36.7 (03 Dec 2018 23:00), Max: 36.8 (03 Dec 2018 15:00)  T(F): 98 (03 Dec 2018 23:00), Max: 98.2 (03 Dec 2018 15:00)  HR: 89 (03 Dec 2018 23:00) (74 - 105)  BP: 126/82 (03 Dec 2018 14:00) (102/69 - 126/82)  BP(mean): 96 (03 Dec 2018 14:00) (78 - 96)  RR: 19 (03 Dec 2018 23:00) (12 - 27)  SpO2: 96% (03 Dec 2018 23:00) (93% - 100%)    EXAM  AOx3, FC, PERRL, EOMI  5/5 throughout, no drift  SILT

## 2018-12-04 NOTE — PROGRESS NOTE ADULT - SUBJECTIVE AND OBJECTIVE BOX
HPI: 69 yo male with PMH of HTN,  HLD, left parietal meningioma s/p craniotomy for resection 2001, seizures (last 2011) with incidental finding of severe right carotid artery stenosis s/p right carotid endarterectomy on 3/13/2017. Presents to scheduled Left Carotid Endarterectomy due to stenoses progression on 12/3/2018. (20 Nov 2018 07:24)    On admission, the patient was:  GCS: 15    Overnight Events: Patient desatted to 70; was placed on 2L NC. Has hx of HAYLEY. Does not use CPAP or BiPAP at home.     ROS: Negative except what is mentioned above.     VITALS:   T(C): 36.5 (12-04-18 @ 07:00), Max: 36.8 (12-03-18 @ 15:00)  HR: 81 (12-04-18 @ 09:00) (65 - 105)  BP: 151/84 (12-04-18 @ 09:00) (102/69 - 151/84)  RR: 13 (12-04-18 @ 09:00) (12 - 27)  SpO2: 97% (12-04-18 @ 09:00) (93% - 100%)    12-03-18 @ 07:01  -  12-04-18 @ 07:00  --------------------------------------------------------  IN: 1700 mL / OUT: 2675 mL / NET: -975 mL    12-04-18 @ 07:01  -  12-04-18 @ 09:30  --------------------------------------------------------  IN: 300 mL / OUT: 0 mL / NET: 300 mL      LABS:                          14.0   13.2  )-----------( 197      ( 03 Dec 2018 22:10 )             39.2     12-03    138  |  106  |  12  ----------------------------<  193<H>  3.9   |  18<L>  |  0.78    Ca    9.1      03 Dec 2018 22:10  Phos  2.2     12-03  Mg     2.3     12-03      PT/INR - ( 03 Dec 2018 07:15 )   PT: 12.7 sec;   INR: 1.10 ratio           MEDS:  MEDICATIONS  (STANDING):  amLODIPine   Tablet 2.5 milliGRAM(s) Oral daily  aspirin  chewable 81 milliGRAM(s) Oral daily  chlorhexidine 4% Liquid 1 Application(s) Topical <User Schedule>  docusate sodium 100 milliGRAM(s) Oral three times a day  insulin lispro (HumaLOG) corrective regimen sliding scale   SubCutaneous Before meals and at bedtime  pantoprazole    Tablet 40 milliGRAM(s) Oral before breakfast  simvastatin 40 milliGRAM(s) Oral at bedtime  topiramate 100 milliGRAM(s) Oral two times a day      EXAMINATION:  General:  calm  HEENT:  MMM, neck incision c/d/i  Neuro:  awake, alert, oriented x 3, follows commands, moves all extremities 5/5. Face symmetrical.   Cards:  RRR  Respiratory:  no respiratory distress  Abdomen:  soft  Extremities:  no edema  Skin:  warm/dry

## 2018-12-04 NOTE — PROGRESS NOTE ADULT - ASSESSMENT
Summary: 69 yo male with PMH notable for HLD, hx of left parietal meningioma s/p craniotomy for resection 2001, seizures (last 2011), s/p right carotid endarterectomy on 3/13/2017, who presented for elective L Carotid Endarterectomy 2/2 to worsening carotid stenosis.    NEURO:  q1h neuro checks  CTA - pending official read  On  ASA 81 mg po daily  Hx of seizures - No seizures since 2011; continued on Topamax 100 BID  Hx of anxiety - Continued on home med of Alprazolam prn    CARDS:  -160  HTN - Continued on amlodipine 2.5 daily  HLD - Continued on simvastatin 40 qHS    PULM:  sat > 92%  Incentive spirometry    RENAL:  On NS w/ KCl @ 75 ml; IVL once good po intake    GASTRO:  advance as tolerated  Bowel regimen  ---> Stress ulcer prophylaxis:  PPI    HEME:    ---> DVT prophylaxis: SCDs, start Lovenox 40 sc qHS    ENDO:  euglycemia    ID:  Monitor for fevers    Code status:  Full code  Disposition:  Floor    This patient was at high risk of neurologic deterioration and/or death due to: brain hemorrhage; CVA    Time spent:  45 minutes Summary: 67 yo male with PMH notable for HLD, hx of left parietal meningioma s/p craniotomy for resection 2001, seizures (last 2011), s/p right carotid endarterectomy on 3/13/2017, who presented for elective L Carotid Endarterectomy 2/2 to worsening carotid stenosis.    NEURO:  q1h neuro checks  CTA - pending official read  On  ASA 81 mg po daily  Hx of seizures - No seizures since 2011; continued on Topamax 100 BID  Hx of anxiety - Continued on home med of Alprazolam prn    CARDS:  -160  HTN - Increase amlodipine to 5 daily  HLD - Continued on simvastatin 40 qHS    PULM:  sat > 92%  Incentive spirometry    RENAL:  IVL    GASTRO: Consistent Carbohydrate diet  Bowel regimen  ---> Stress ulcer prophylaxis:  PPI    HEME:    ---> DVT prophylaxis: SCDs, start Lovenox 40 sc qHS    ENDO:  euglycemia    ID:  Monitor for fevers    Code status:  Full code  Disposition:  Floor    This patient was at high risk of neurologic deterioration and/or death due to: brain hemorrhage; CVA    Time spent:  45 minutes Summary: 69 yo male with PMH notable for HLD, hx of left parietal meningioma s/p craniotomy for resection 2001, seizures (last 2011), s/p right carotid endarterectomy on 3/13/2017, who presented for elective L Carotid Endarterectomy 2/2 to worsening carotid stenosis.    NEURO:    CTA - pending official read  On  ASA 81 mg po daily  Hx of seizures - No seizures since 2011; continued on Topamax 100 BID  Hx of anxiety - Continued on home med of Alprazolam prn    CARDS:  -160  HTN - Increase amlodipine to 5 daily  HLD - Continued on simvastatin 40 qHS    PULM:  sat > 92%  Incentive spirometry    RENAL:  IVL    GASTRO: Consistent Carbohydrate diet  Bowel regimen  ---> Stress ulcer prophylaxis:  PPI    HEME:    ---> DVT prophylaxis: SCDs, start Lovenox 40 sc qHS    ENDO:  euglycemia    ID:  Monitor for fevers    Code status:  Full code  Disposition:  Floor

## 2018-12-05 VITALS — SYSTOLIC BLOOD PRESSURE: 175 MMHG | HEART RATE: 80 BPM | OXYGEN SATURATION: 97 % | DIASTOLIC BLOOD PRESSURE: 94 MMHG

## 2018-12-05 LAB
ANION GAP SERPL CALC-SCNC: 13 MMOL/L — SIGNIFICANT CHANGE UP (ref 5–17)
BUN SERPL-MCNC: 13 MG/DL — SIGNIFICANT CHANGE UP (ref 7–23)
CALCIUM SERPL-MCNC: 9.6 MG/DL — SIGNIFICANT CHANGE UP (ref 8.4–10.5)
CHLORIDE SERPL-SCNC: 106 MMOL/L — SIGNIFICANT CHANGE UP (ref 96–108)
CO2 SERPL-SCNC: 20 MMOL/L — LOW (ref 22–31)
CREAT SERPL-MCNC: 0.83 MG/DL — SIGNIFICANT CHANGE UP (ref 0.5–1.3)
GLUCOSE BLDC GLUCOMTR-MCNC: 102 MG/DL — HIGH (ref 70–99)
GLUCOSE SERPL-MCNC: 96 MG/DL — SIGNIFICANT CHANGE UP (ref 70–99)
HCT VFR BLD CALC: 40.8 % — SIGNIFICANT CHANGE UP (ref 39–50)
HGB BLD-MCNC: 13.8 G/DL — SIGNIFICANT CHANGE UP (ref 13–17)
MCHC RBC-ENTMCNC: 30.5 PG — SIGNIFICANT CHANGE UP (ref 27–34)
MCHC RBC-ENTMCNC: 33.8 GM/DL — SIGNIFICANT CHANGE UP (ref 32–36)
MCV RBC AUTO: 90.3 FL — SIGNIFICANT CHANGE UP (ref 80–100)
PLATELET # BLD AUTO: 183 K/UL — SIGNIFICANT CHANGE UP (ref 150–400)
POTASSIUM SERPL-MCNC: 3.8 MMOL/L — SIGNIFICANT CHANGE UP (ref 3.5–5.3)
POTASSIUM SERPL-SCNC: 3.8 MMOL/L — SIGNIFICANT CHANGE UP (ref 3.5–5.3)
RBC # BLD: 4.52 M/UL — SIGNIFICANT CHANGE UP (ref 4.2–5.8)
RBC # FLD: 12.4 % — SIGNIFICANT CHANGE UP (ref 10.3–14.5)
SODIUM SERPL-SCNC: 139 MMOL/L — SIGNIFICANT CHANGE UP (ref 135–145)
WBC # BLD: 13.71 K/UL — HIGH (ref 3.8–10.5)
WBC # FLD AUTO: 13.71 K/UL — HIGH (ref 3.8–10.5)

## 2018-12-05 PROCEDURE — 82962 GLUCOSE BLOOD TEST: CPT

## 2018-12-05 PROCEDURE — C1889: CPT

## 2018-12-05 PROCEDURE — 88304 TISSUE EXAM BY PATHOLOGIST: CPT

## 2018-12-05 PROCEDURE — 80048 BASIC METABOLIC PNL TOTAL CA: CPT

## 2018-12-05 PROCEDURE — 97161 PT EVAL LOW COMPLEX 20 MIN: CPT

## 2018-12-05 PROCEDURE — 84100 ASSAY OF PHOSPHORUS: CPT

## 2018-12-05 PROCEDURE — 83735 ASSAY OF MAGNESIUM: CPT

## 2018-12-05 PROCEDURE — 85027 COMPLETE CBC AUTOMATED: CPT

## 2018-12-05 PROCEDURE — 70498 CT ANGIOGRAPHY NECK: CPT

## 2018-12-05 PROCEDURE — 85610 PROTHROMBIN TIME: CPT

## 2018-12-05 PROCEDURE — 70496 CT ANGIOGRAPHY HEAD: CPT

## 2018-12-05 PROCEDURE — C1769: CPT

## 2018-12-05 RX ORDER — AMLODIPINE BESYLATE 2.5 MG/1
1 TABLET ORAL
Qty: 30 | Refills: 0 | OUTPATIENT
Start: 2018-12-05 | End: 2019-01-03

## 2018-12-05 RX ORDER — DOCUSATE SODIUM 100 MG
1 CAPSULE ORAL
Qty: 0 | Refills: 0 | COMMUNITY
Start: 2018-12-05

## 2018-12-05 RX ORDER — SENNA PLUS 8.6 MG/1
2 TABLET ORAL
Qty: 0 | Refills: 0 | COMMUNITY
Start: 2018-12-05

## 2018-12-05 RX ORDER — ACETAMINOPHEN 500 MG
2 TABLET ORAL
Qty: 0 | Refills: 0 | COMMUNITY
Start: 2018-12-05

## 2018-12-05 RX ORDER — AMLODIPINE BESYLATE 2.5 MG/1
1 TABLET ORAL
Qty: 0 | Refills: 0 | COMMUNITY

## 2018-12-05 RX ORDER — OXYCODONE HYDROCHLORIDE 5 MG/1
1 TABLET ORAL
Qty: 20 | Refills: 0 | OUTPATIENT
Start: 2018-12-05 | End: 2018-12-09

## 2018-12-05 RX ADMIN — AMLODIPINE BESYLATE 5 MILLIGRAM(S): 2.5 TABLET ORAL at 05:27

## 2018-12-05 RX ADMIN — OXYCODONE HYDROCHLORIDE 5 MILLIGRAM(S): 5 TABLET ORAL at 06:00

## 2018-12-05 RX ADMIN — OXYCODONE HYDROCHLORIDE 5 MILLIGRAM(S): 5 TABLET ORAL at 05:27

## 2018-12-05 RX ADMIN — Medication 100 MILLIGRAM(S): at 05:27

## 2018-12-05 RX ADMIN — Medication 100 MILLIGRAM(S): at 06:12

## 2018-12-05 NOTE — PHYSICAL THERAPY INITIAL EVALUATION ADULT - ADDITIONAL COMMENTS
Pt lives with mother in private home with no stairs to enter or within. 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

## 2018-12-05 NOTE — PROGRESS NOTE ADULT - SUBJECTIVE AND OBJECTIVE BOX
SUBJECTIVE: Patient seen and examined at bedside. Complaining of some incisional pain, improved with oral pain medications. Denies chest pain, shortness of breath, nausea/vomiting.     Vital Signs Last 24 Hrs  T(C): 36.8 (12-05-18 @ 05:00), Max: 36.9 (12-04-18 @ 23:53)  T(F): 98.2 (12-05-18 @ 05:00), Max: 98.4 (12-04-18 @ 23:53)  HR: 80 (12-05-18 @ 09:41) (66 - 90)  BP: 175/94 (12-05-18 @ 09:41) (131/79 - 175/94)  BP(mean): 97 (12-04-18 @ 15:00) (97 - 104)  RR: 18 (12-05-18 @ 05:00) (18 - 20)  SpO2: 97% (12-05-18 @ 09:41) (94% - 99%)    PHYSICAL EXAM:    Constitutional: No Acute Distress, resting comfortably in bed    Neurological: Awake, alert, oriented to person, place and time, speech clear and fluent, face equal, tongue midline, briskly following commands, no drift, moves all extremities with 5/5 strength, sensation intact to light touch throughout, pupils 4mm and reactive bilaterally, extraocular movements intact, no nystagmus    Incision: +left neck incision C/D/I; no swelling, site soft      Pulmonary: Clear to Auscultation, No rales, No rhonchi, No wheezes     Cardiovascular: S1, S2, Regular rate and rhythm     Gastrointestinal: Soft, Non-tender, Non-distended, +bowel sounds x 4    Extremities: No calf tenderness bilaterally, no cyanosis, clubbing or edema        LABS:                          13.8   13.71 )-----------( 183      ( 05 Dec 2018 08:07 )             40.8    12-05    139  |  106  |  13  ----------------------------<  96  3.8   |  20<L>  |  0.83    Ca    9.6      05 Dec 2018 06:47  Phos  2.2     12-03  Mg     2.3     12-03 12-04 @ 07:01  -  12-05 @ 07:00  --------------------------------------------------------  IN: 893 mL / OUT: 1250 mL / NET: -357 mL        IMAGING:     < from: CT Angio Head w/ IV Cont (12.04.18 @ 08:42) >  EXAM:  CT ANGIO NECK (W)AW IC                          EXAM:  CT ANGIO BRAIN (W)AW IC                            PROCEDURE DATE:  12/04/2018            INTERPRETATION:  HISTORY: Postoperative status post carotid   endarterectomy. Carotid stenosis. I 65.29.    Description: A CT scan of the head and a CT angiogram study of the neck   and head were performed.    The head CT was performed with axial images with coronal and sagittal   reformatted series. The CT angiogram study was performed with axial thin   section images with 2-D and 3-D maximum intensity projection MIP   reformatted series.    70 cc intravenous Omnipaque 300 contrast was administered, 30 cc contrast   was discarded.    Comparison is made to the CT angiogram study from 10/30/2018.    Head CT:    Left frontal craniotomy changes are again noted. Postoperative changes   with encephalomalacia are again noted in the left frontal lobe, with   associated volume loss and hypodensity, grossly stable compared to the   prior CT study.     There is no CT evidence for acute infarct, acute hemorrhage, or   hydrocephalus.    CTA neck:    New left carotid endarterectomy postsurgical change is noted. There is no   evidence for residual stenosis via NASCET criteria. 0% stenosis is noted.    Right carotid endarterectomy postsurgical change is again noted. Minimal   stenosis involves the origin of the right internal carotid artery which   appears stable compared to the prior CTA study, measuring roughly 20% via   NASCET criteria.    The right vertebral artery is dominant. There is no evidence for   vertebral artery stenosis.    CTA HEAD:    There is no evidence for focal stenosis, major vessel occlusion, or   aneurysm about the [place]. The right vertebral artery is   dominant.    IMPRESSION:    Status post left carotid endarterectomy without evidence for residual   stenosis.      FREDI PAYNE M.D., ATTENDING RADIOLOGIST  This document has been electronically signed. Dec  4 2018  1:17PM    < end of copied text >      MEDICATIONS:  Antibiotics:    Neuro:  acetaminophen   Tablet .. 650 milliGRAM(s) Oral every 6 hours PRN Temp greater or equal to 38C (100.4F), Mild Pain (1 - 3)  ALPRAZolam 0.5 milliGRAM(s) Oral three times a day PRN anxiety  ondansetron Injectable 4 milliGRAM(s) IV Push every 6 hours PRN Nausea and/or Vomiting  oxyCODONE    IR 5 milliGRAM(s) Oral every 6 hours PRN Moderate Pain (4 - 6)  topiramate 100 milliGRAM(s) Oral two times a day    Cardiac:  amLODIPine   Tablet 5 milliGRAM(s) Oral daily    Pulm:    GI/:  docusate sodium 100 milliGRAM(s) Oral three times a day  senna 2 Tablet(s) Oral at bedtime PRN Constipation    Other:   aspirin  chewable 81 milliGRAM(s) Oral daily  chlorhexidine 4% Liquid 1 Application(s) Topical <User Schedule>  enoxaparin Injectable 40 milliGRAM(s) SubCutaneous <User Schedule>  insulin lispro (HumaLOG) corrective regimen sliding scale   SubCutaneous Before meals and at bedtime  simvastatin 40 milliGRAM(s) Oral at bedtime        DIET: consistent carbohydrate diet

## 2018-12-05 NOTE — PHYSICAL THERAPY INITIAL EVALUATION ADULT - PERTINENT HX OF CURRENT PROBLEM, REHAB EVAL
69 yo male with PMH of HTN,  HLD, left parietal meningioma s/p craniotomy for resection 2001, seizures (last 2011) with incidental finding of severe right carotid artery stenosis s/p right carotid endarterectomy on 3/13/2017. Presents to scheduled Left Carotid Endarterectomy due to stenoses progression on 12/3/2018.

## 2018-12-05 NOTE — PROGRESS NOTE ADULT - ASSESSMENT
HPI: 69 yo male with PMH of HTN,  HLD, left parietal meningioma s/p craniotomy for resection 2001, seizures (last 2011) with incidental finding of severe right carotid artery stenosis s/p right carotid endarterectomy on 3/13/2017. Presents to scheduled Left Carotid Endarterectomy due to stenoses progression on 12/3/2018. (20 Nov 2018 07:24)    PROCEDURE: 12/3 s/p left Carotid endarterectomy      PLAN:   -Pain control with Oxycodone IR PRN  -Continue Aspirin 81mg PO daily for carotid artery stenosis  -Continue Topamax 100 BID for history of seizures  -Continue Norvasc 5mg daily for HTN; dose was increased yesterday. BP elevated today after working with physical therapy. Will trend BP today.   -Xanax PRN for anxiety (home med)  -On continuous pulse ox for hx of HAYLEY; not on supplemental O2 at night at home. No episodes of hypoxia overnight.   -Continue colace, senna for bowel regimen while on pain meds  -Leukocytosis; likely reactive as patient is afebrile. Monitor for signs/symptoms of infection.   -Encouraged mobilization/ambulation  -Encouraged incentive spirometer  -DVT prophylaxis: SQL, venodynes  -Dispo: no PT needs  -Discharge home today  -Will discuss with Dr. Brown    [facility] # [id number]

## 2018-12-06 PROBLEM — I65.22 OCCLUSION AND STENOSIS OF LEFT CAROTID ARTERY: Chronic | Status: ACTIVE | Noted: 2018-11-20

## 2018-12-06 NOTE — DISCUSSION/SUMMARY
[Home] : patient was discharged to home [Other:_____] : [unfilled] [FreeTextEntry1] : Admission Date:\par - Admission Date 03-Dec-2018 05:57.\par \par Discharge Date:\par - Discharge Date 05-Dec-2018\par \par Reason for Admission:\par Reason for Admission Admitted 12/3 for elective left carotid endarterectomy\par LM for patient to speak about this recent admission and discharge from [facility].  I reviewed this patient's discharge medications list and have noted that Amlodipine has been increase from 2.5 mg daily to 5 mg daily.  This has been updated on the patient's medication list.  He has an appointment to be seen in the office.  Awaiting a call back from the patient.  Dr. Madsen was made aware. [FreeTextEntry3] : Transition of care Appointment is scheduled for 2/18/2018 with Dr. Madsen

## 2018-12-17 ENCOUNTER — APPOINTMENT (OUTPATIENT)
Dept: NEUROSURGERY | Facility: CLINIC | Age: 68
End: 2018-12-17
Payer: MEDICARE

## 2018-12-17 VITALS — DIASTOLIC BLOOD PRESSURE: 99 MMHG | SYSTOLIC BLOOD PRESSURE: 159 MMHG

## 2018-12-17 VITALS
WEIGHT: 222 LBS | HEART RATE: 85 BPM | RESPIRATION RATE: 17 BRPM | TEMPERATURE: 98 F | BODY MASS INDEX: 30.07 KG/M2 | HEIGHT: 72 IN | OXYGEN SATURATION: 95 %

## 2018-12-17 DIAGNOSIS — I77.71 DISSECTION OF CAROTID ARTERY: ICD-10-CM

## 2018-12-17 PROCEDURE — 99024 POSTOP FOLLOW-UP VISIT: CPT

## 2018-12-18 ENCOUNTER — APPOINTMENT (OUTPATIENT)
Dept: INTERNAL MEDICINE | Facility: CLINIC | Age: 68
End: 2018-12-18
Payer: MEDICARE

## 2018-12-18 ENCOUNTER — APPOINTMENT (OUTPATIENT)
Dept: INTERNAL MEDICINE | Facility: CLINIC | Age: 68
End: 2018-12-18

## 2018-12-18 VITALS — DIASTOLIC BLOOD PRESSURE: 70 MMHG | SYSTOLIC BLOOD PRESSURE: 136 MMHG

## 2018-12-18 VITALS
HEART RATE: 88 BPM | WEIGHT: 226 LBS | DIASTOLIC BLOOD PRESSURE: 90 MMHG | SYSTOLIC BLOOD PRESSURE: 130 MMHG | OXYGEN SATURATION: 96 % | HEIGHT: 71.5 IN | BODY MASS INDEX: 30.95 KG/M2 | TEMPERATURE: 98 F

## 2018-12-18 DIAGNOSIS — E66.9 OBESITY, UNSPECIFIED: ICD-10-CM

## 2018-12-18 PROCEDURE — 99495 TRANSJ CARE MGMT MOD F2F 14D: CPT

## 2018-12-18 NOTE — REVIEW OF SYSTEMS
[Impotence] : impotence [Joint Pain] : joint pain [Negative] : Heme/Lymph [Dysuria] : no dysuria [Poor Libido] : libido not poor

## 2018-12-18 NOTE — PHYSICAL EXAM
[No Acute Distress] : no acute distress [Well Nourished] : well nourished [Well Developed] : well developed [Well-Appearing] : well-appearing [Normal Voice/Communication] : normal voice/communication [Normal Sclera/Conjunctiva] : normal sclera/conjunctiva [PERRL] : pupils equal round and reactive to light [EOMI] : extraocular movements intact [Normal Outer Ear/Nose] : the outer ears and nose were normal in appearance [Normal Oropharynx] : the oropharynx was normal [Normal TMs] : both tympanic membranes were normal [Supple] : supple [No Lymphadenopathy] : no lymphadenopathy [Thyroid Normal, No Nodules] : the thyroid was normal and there were no nodules present [No Respiratory Distress] : no respiratory distress  [Clear to Auscultation] : lungs were clear to auscultation bilaterally [No Accessory Muscle Use] : no accessory muscle use [Normal Rate] : normal rate  [Regular Rhythm] : with a regular rhythm [Normal S1, S2] : normal S1 and S2 [No Murmur] : no murmur heard [No Edema] : there was no peripheral edema [Normal Appearance] : normal in appearance [Soft] : abdomen soft [Non Tender] : non-tender [Non-distended] : non-distended [No Masses] : no abdominal mass palpated [No HSM] : no HSM [Normal Bowel Sounds] : normal bowel sounds [Penis Abnormality] : normal uncircumcised penis [Scrotum] : the scrotum was normal [Testes Tenderness] : no tenderness of the testes [No Joint Swelling] : no joint swelling [Grossly Normal Strength/Tone] : grossly normal strength/tone [No Rash] : no rash [Normal Gait] : normal gait [Coordination Grossly Intact] : coordination grossly intact [No Focal Deficits] : no focal deficits [Deep Tendon Reflexes (DTR)] : deep tendon reflexes were 2+ and symmetric [Normal Affect] : the affect was normal [Normal Mood] : the mood was normal [Normal Insight/Judgement] : insight and judgment were intact [Acne] : no acne [de-identified] : incision (dissolvable sutures) over left side of neck is clean and intact; old surgical scar over right carotid [de-identified] : defer to GI/ [de-identified] : faded erythematous macular rash on left upper arm; scattered nevi on torso and back; tanned skin

## 2018-12-18 NOTE — HISTORY OF PRESENT ILLNESS
[Post-hospitalization from ___ Hospital] : Post-hospitalization from [unfilled] Hospital [Admitted on: ___] : The patient was admitted on [unfilled] [Discharged on ___] : discharged on [unfilled] [Discharge Summary] : discharge summary [Pertinent Labs] : pertinent labs [Radiology Findings] : radiology findings [Discharge Med List] : discharge medication list [Med Reconciliation] : medication reconciliation has been completed [Patient Contacted By: ____] : and contacted by [unfilled] [FreeTextEntry2] : Patient had left carotid endarterectomy on 12/3 at [facility]. Stayed home in hospital for few days, discharged home on 12/5. See hospital course for further details.\par \par Hospital Course: \par Patient is a 68 year old male with a past medical history of of HTN, HLD, left \par parietal meningioma s/p craniotomy for resection 2001, seizures (last 2011) \par with incidental finding of severe bilateral carotid artery stenosis s/p right \par carotid endarterectomy on 3/13/2017. He was admitted on 12/3/18 through same \par day admitting for scheduled left carotid endarterectomy due to stenoses \par progression. Post operative CTA head revealed expected post operative changes. \par Hospital course notable for elevated blood pressure for which Norvasc was \par increased from 2.5mg daily to 5mg daily. Otherwise hospital course \par uncomplicated. He was evaluated by physical therapy who recommended no needs \par upon discharge. On the day of discharge, he is medically and neurosurgically \par stable and cleared for discharge home. \par \par He is doing well, no complaints. Saw neurosurgery NP yesterday, left neck incision is clean and intact. Sutures are dissolvable. He has no neck pain. He is glad he had the surgery. He has been taking Norvasc 2.5 BID (instead of 5 mg qd).\par

## 2018-12-18 NOTE — ASSESSMENT
[FreeTextEntry1] : 1. Left neck incision is clean and intact; evaluated by neurosurgery NP yesterday. Sutures will dissolve on own.\par \par 2. His BP was higher in hospital so Norvasc increased to 5 mg qd. He has been splitting the dose and taking 2.5 mg BID which is fine for now. BP today is controlled. Low salt diet. \par \par 3. Obesity: he is interested in seeing dietician to lose some weight. Referral placed to [facility] weight management program. \par \par RTO in 6/19.

## 2018-12-19 ENCOUNTER — TRANSCRIPTION ENCOUNTER (OUTPATIENT)
Age: 68
End: 2018-12-19

## 2019-01-11 ENCOUNTER — APPOINTMENT (OUTPATIENT)
Dept: NEUROSURGERY | Facility: CLINIC | Age: 69
End: 2019-01-11
Payer: MEDICARE

## 2019-01-11 VITALS
WEIGHT: 225 LBS | DIASTOLIC BLOOD PRESSURE: 94 MMHG | BODY MASS INDEX: 30.48 KG/M2 | HEART RATE: 78 BPM | OXYGEN SATURATION: 97 % | HEIGHT: 72 IN | RESPIRATION RATE: 17 BRPM | SYSTOLIC BLOOD PRESSURE: 149 MMHG | TEMPERATURE: 97.5 F

## 2019-01-11 DIAGNOSIS — I65.29 OCCLUSION AND STENOSIS OF UNSPECIFIED CAROTID ARTERY: ICD-10-CM

## 2019-01-11 PROCEDURE — 99024 POSTOP FOLLOW-UP VISIT: CPT

## 2019-01-11 RX ORDER — ELECTROLYTES/DEXTROSE
SOLUTION, ORAL ORAL
Refills: 0 | Status: ACTIVE | COMMUNITY
Start: 2019-01-11

## 2019-01-29 NOTE — REVIEW OF SYSTEMS
[Feeling Poorly] : feeling poorly [Depression] : depression [Tension Headache] : tension-type headaches [Skin Wound] : skin wound [Negative] : Heme/Lymph [Anxiety] : anxiety [Dizziness] : no dizziness [Fainting] : no fainting [Lightheadedness] : no lightheadedness [Vertigo] : no vertigo [Difficulty Walking] : no difficulty walking [Frequent Falls] : not falling [Eyesight Problems] : no eyesight problems

## 2019-01-29 NOTE — PHYSICAL EXAM
[FreeTextEntry1] : Awake, alert, and oriented x 3. Speech is clear and appropriate.  Affect is normal.  Voice is strong.  Respirations easy and even.  Short and long term memory intact.  Attention span and concentration intact.  Language fluency, comprehension, and reading intact.  Fund of knowledge intact.  Normal skin color and pigmentation.  The sclera and conjunctiva normal.  Ears, nose, and neck normal in appearance. EOMI, no nystagmus, facial sensation intact symmetrically, face symmetrical, hearing intact bilaterally, tongue and palate midline, head turning and shoulder shrug symmetric and no tongue deviation with protrusion.  No pronator drift.   No past-pointing, no tremors noted, no dysdiadochokinesia, and finger to nose dysmetria was not present.  Romberg negative. Right hand dominant. Incision to left anterior neck\par \par Rises from a seated position in a fluent and comfortable fashion.  Gait is well coordinated and stable without the use of an assistive device.  Motor strength in the upper extremities 5/5 in the biceps, triceps, and hand .  Motor strength in the lower extremities is 5/5 in the iliopsoas, quadriceps, and hamstrings.\par

## 2019-01-29 NOTE — REASON FOR VISIT
[de-identified] : left carotid micro-endarterectomy [de-identified] : 12/3/2018 [de-identified] : 40 [de-identified] : 7 [de-identified] : Pathology: calcific atherosclerotic plaque

## 2019-01-29 NOTE — HISTORY OF PRESENT ILLNESS
[FreeTextEntry1] : CARL VALDES is a 68 year old male here on 01/11/2019, 3.5 weeks after he was last seen and 5.5 weeks after undergoing a left carotid micro-endarterectomy on 12/3/2018.  He is about 2 years s/p right CEA on 3/13/2017 as well as almost 18 years s/p meningioma resection in 2001.  He comes to the office today reporting feeling "not well".  He has headaches, the incision is swollen and he is easily agitated. He has a funny feeling in his chest and is lightheaded.  When he gets agitated, his neck swells up. He gets a "tension" headache at both temples every day and takes Advil 400 mg several times a day.  They can last "hours".  They are rated a level 6-7/10 at its worst. No dizziness.  He does get nausea.  He saw his PCP before and after the surgery.  He had a stress test prior to surgery and reports it was fine.  He is under a lot of stress taking care of his 90 y.o. mother with dementia and he helps out with sponsoring AA members. 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 \par

## 2019-01-29 NOTE — ASSESSMENT
[FreeTextEntry1] : IMPRESSION:\par 1) 6 weeks s/p left carotid endarterectomy for significant carotid stenosis \par \par PLAN:\par 1) Headache likely unrelated to the surgery - continue to monitor\par 2) CTA neck with contrast 2 years postop and follow-up afterwards in the office\par 3) Continue f/u with Dr. Moeller and have MRI in March or April as scheduled for meningioma surveillance\par 4) BP was high in the office today and instructed to monitor. To call PCP if consistently greater higher than 130/80.  Verbalized understanding.  States he checks BP every day and is always good.\par 5) If continues with chest tightness, f/u with cardiology

## 2019-02-08 ENCOUNTER — OTHER (OUTPATIENT)
Age: 69
End: 2019-02-08

## 2019-02-19 ENCOUNTER — MED ADMIN CHARGE (OUTPATIENT)
Age: 69
End: 2019-02-19

## 2019-03-04 ENCOUNTER — OTHER (OUTPATIENT)
Age: 69
End: 2019-03-04

## 2019-03-14 ENCOUNTER — OUTPATIENT (OUTPATIENT)
Dept: OUTPATIENT SERVICES | Facility: HOSPITAL | Age: 69
LOS: 1 days | End: 2019-03-14
Payer: MEDICARE

## 2019-03-14 ENCOUNTER — APPOINTMENT (OUTPATIENT)
Dept: MRI IMAGING | Facility: CLINIC | Age: 69
End: 2019-03-14
Payer: MEDICARE

## 2019-03-14 DIAGNOSIS — S52.532A COLLES' FRACTURE OF LEFT RADIUS, INITIAL ENCOUNTER FOR CLOSED FRACTURE: Chronic | ICD-10-CM

## 2019-03-14 DIAGNOSIS — C43.59 MALIGNANT MELANOMA OF OTHER PART OF TRUNK: Chronic | ICD-10-CM

## 2019-03-14 DIAGNOSIS — D32.9 BENIGN NEOPLASM OF MENINGES, UNSPECIFIED: ICD-10-CM

## 2019-03-14 DIAGNOSIS — Z98.890 OTHER SPECIFIED POSTPROCEDURAL STATES: Chronic | ICD-10-CM

## 2019-03-14 DIAGNOSIS — Z98.89 OTHER SPECIFIED POSTPROCEDURAL STATES: Chronic | ICD-10-CM

## 2019-03-14 DIAGNOSIS — G40.109 LOCALIZATION-RELATED (FOCAL) (PARTIAL) SYMPTOMATIC EPILEPSY AND EPILEPTIC SYNDROMES WITH SIMPLE PARTIAL SEIZURES, NOT INTRACTABLE, WITHOUT STATUS EPILEPTICUS: ICD-10-CM

## 2019-03-14 PROCEDURE — A9585: CPT

## 2019-03-14 PROCEDURE — 70553 MRI BRAIN STEM W/O & W/DYE: CPT | Mod: 26

## 2019-03-14 PROCEDURE — 70553 MRI BRAIN STEM W/O & W/DYE: CPT

## 2019-03-26 ENCOUNTER — APPOINTMENT (OUTPATIENT)
Dept: NEUROLOGY | Facility: CLINIC | Age: 69
End: 2019-03-26
Payer: MEDICARE

## 2019-03-26 VITALS
HEART RATE: 75 BPM | DIASTOLIC BLOOD PRESSURE: 88 MMHG | WEIGHT: 227 LBS | BODY MASS INDEX: 30.75 KG/M2 | SYSTOLIC BLOOD PRESSURE: 152 MMHG | HEIGHT: 72 IN

## 2019-03-26 PROCEDURE — 99214 OFFICE O/P EST MOD 30 MIN: CPT

## 2019-03-26 NOTE — ASSESSMENT
[FreeTextEntry1] : Mr. VALDES developed seizures in setting of meningioma.  He has been free of convulsions - and perhaps seizures - for many years. However, he also endorses a "strange" feeling he gets a few times a year lasting a minute or two. I would lie to r/o that these are seizure auras and recommend that we adjust his seizure medication to see if we can suppress the event. As I think sodium channel mechanism AEDs are more effective, I suggested addition of lacosamide. \par \par Plan:\par 1. continue topiramate at current dose\par 2. start Vimpat 50 bid -increase to 100 bid in 2 weeks.\par 3. get recent ekg from cardiologist\par 4. RTC 4 wks. - I will likely increase lacosamide to 150 q12 and decrease topiramate to 50 q12\par \par Greater than 50% of the encounter time was spent on counseling and coordination of care for reviewing records in [employer], discussion with patient regarding plan. I have spent 25 minutes of face to face time with the patient reviewing the cause of seizures or seizure-like events, assessing the risk of recurrence, educating the patient or family to recognize seizures, and discussing possible treatment options and possible side effects of seizure medications. I also discussed seizure safety, and ways of reducing seizure risk.\par \par

## 2019-03-26 NOTE — HISTORY OF PRESENT ILLNESS
[FreeTextEntry1] : Cardiologist - Shola Quinteros MD - [phone]\par \par *** 03/26/2019 ***\par  Mr. VALDES had L carotide endarterectomy. Still bothered by surgey scar.  he recalls that aura was one of anxiety. Had one again the other night. Caring for a ill mother - dementia-  that causes a lot of stress. Aura may occur a few times a year.  This has been his pattern over years. The aura feeling is described as "my mind wanders"; he was driving yesterday and decided to pull over. Feeling lasts a few minutes. \par \par *** 10/31/2018 ***\par 68y M previously seen by Dr. Vega.  Dx with R frontal convexity meningioma after presenting with seizure in 2001. Resected by Dr. Porras shortly after it was found.  Initially on Dilantin, but developed rash and there was also concern for hepatotoxicity (prior h/o ETOHA with liver failure?). He was eventually put on topiramate 100 q12 and has been seizure free for last seizure 8 years.  His aura was a "strange feeling"  that a seizure was going to happen.\par

## 2019-03-26 NOTE — PHYSICAL EXAM
[FreeTextEntry1] : alert and oriented x 3, speech fluent, names easily, follows requests, good recall for recent and remote events.\par EOM full without sustained nystagmus, PERRL, face symmetrical, no dysarthria\par Motor - full strength in all extremities. normal rapid-alternating movements.\par Sensory - intact LT bilaterally\par Coord - no tremor, ataxia\par Gait - stands without difficulty, normal gait. \par

## 2019-04-10 ENCOUNTER — MEDICATION RENEWAL (OUTPATIENT)
Age: 69
End: 2019-04-10

## 2019-04-23 ENCOUNTER — APPOINTMENT (OUTPATIENT)
Dept: NEUROLOGY | Facility: CLINIC | Age: 69
End: 2019-04-23
Payer: MEDICARE

## 2019-04-23 VITALS
BODY MASS INDEX: 30.2 KG/M2 | DIASTOLIC BLOOD PRESSURE: 77 MMHG | HEIGHT: 72 IN | SYSTOLIC BLOOD PRESSURE: 130 MMHG | HEART RATE: 78 BPM | WEIGHT: 223 LBS

## 2019-04-23 PROCEDURE — 99213 OFFICE O/P EST LOW 20 MIN: CPT

## 2019-04-23 NOTE — HISTORY OF PRESENT ILLNESS
[FreeTextEntry1] : Cardiologist - Shola Quinteros MD - [phone]\par \par *** 04/23/2019  ***\par Mr. VALDES is tolerating lacosamide well, no problems. He is eager to taper off topiramate. Last week he had coronary artery stents placed. Mr. VALDES is currently on dual antiplatelet therapy, having addid Plavix to ASA. \par \par *** 03/26/2019 ***\par  Mr. VALDES had L carotide endarterectomy. Still bothered by surgey scar.  he recalls that aura was one of anxiety. Had one again the other night. Caring for a ill mother - dementia-  that causes a lot of stress. Aura may occur a few times a year.  This has been his pattern over years. The aura feeling is described as "my mind wanders"; he was driving yesterday and decided to pull over. Feeling lasts a few minutes. \par \par *** 10/31/2018 ***\par 68y M previously seen by Dr. Vega.  Dx with R frontal convexity meningioma after presenting with seizure in 2001. Resected by Dr. Porras shortly after it was found.  Initially on Dilantin, but developed rash and there was also concern for hepatotoxicity (prior h/o ETOHA with liver failure?). He was eventually put on topiramate 100 q12 and has been seizure free for last seizure 8 years.  His aura was a "strange feeling"  that a seizure was going to happen.\par

## 2019-04-23 NOTE — ASSESSMENT
[FreeTextEntry1] : Mr. VALDES developed seizures in setting of meningioma. He has been free of convulsions - and perhaps seizures - for many years. However, he endorsed a "strange" feeling he gets a few times a year lasting a minute or two. He started lacosamide in March, to see if "strange feeling" was suppressed. So far, he feels very well on lacosamide. \par \par Plan:\par 1. taper topiramate 100 qHS x 1 week then DC\par 2. continue Vimpat 100 bid.\par 3. RTC 3 mo

## 2019-06-11 ENCOUNTER — APPOINTMENT (OUTPATIENT)
Dept: INTERNAL MEDICINE | Facility: CLINIC | Age: 69
End: 2019-06-11
Payer: MEDICARE

## 2019-06-11 VITALS
DIASTOLIC BLOOD PRESSURE: 80 MMHG | SYSTOLIC BLOOD PRESSURE: 114 MMHG | OXYGEN SATURATION: 95 % | WEIGHT: 223 LBS | HEIGHT: 71 IN | BODY MASS INDEX: 31.22 KG/M2 | TEMPERATURE: 98.5 F | HEART RATE: 81 BPM

## 2019-06-11 VITALS — DIASTOLIC BLOOD PRESSURE: 76 MMHG | SYSTOLIC BLOOD PRESSURE: 112 MMHG

## 2019-06-11 DIAGNOSIS — R73.03 PREDIABETES.: ICD-10-CM

## 2019-06-11 DIAGNOSIS — I25.10 ATHEROSCLEROTIC HEART DISEASE OF NATIVE CORONARY ARTERY W/OUT ANGINA PECTORIS: ICD-10-CM

## 2019-06-11 PROCEDURE — 99214 OFFICE O/P EST MOD 30 MIN: CPT | Mod: 25

## 2019-06-11 PROCEDURE — 36415 COLL VENOUS BLD VENIPUNCTURE: CPT

## 2019-06-11 RX ORDER — ASPIRIN 81 MG
81 TABLET, DELAYED RELEASE (ENTERIC COATED) ORAL DAILY
Refills: 0 | Status: ACTIVE | COMMUNITY

## 2019-06-11 RX ORDER — CLOPIDOGREL 75 MG/1
75 TABLET, FILM COATED ORAL
Qty: 90 | Refills: 0 | Status: ACTIVE | COMMUNITY

## 2019-06-11 RX ORDER — CLOPIDOGREL 75 MG/1
75 TABLET, FILM COATED ORAL DAILY
Refills: 0 | Status: DISCONTINUED | COMMUNITY
End: 2019-06-11

## 2019-06-11 NOTE — ASSESSMENT
[FreeTextEntry1] : 1. HTN: the BP is well-controlled, on Norvasc 2.5 mg daily. Low salt diet.\par \par 2. Diagnosed CAD in 4/19 s/p stent to RCA. Now on Plavix. Followed with Dr. Salomon. Was prescribed Atenolol after procedure however unable to tolerate it so discontinued.\par \par 3. Hyperlipidemia: check fasting lipids and chemistries, on Pravastatin. Goal LDL < 100.\par \par 4. Epilepsy: continue Vimpat daily, off Topamax. He developed mild left hand tremor, he will discuss with Dr. Moeller next month.\par \par 5. Borderline diabetes on outside labs in 4/19 - A1c was 5.7. Family hx of DM in father. Check A1c. \par \par 6. ED: refilled Sildenafil, advised to discuss with cardiologist if safe to continue using medication.\par \par RTO 11/19 for annual exam.

## 2019-06-11 NOTE — REVIEW OF SYSTEMS
[Impotence] : impotence [Joint Pain] : joint pain [Negative] : Heme/Lymph [Recent Change In Weight] : ~T recent weight change [Insomnia] : insomnia [Anxiety] : anxiety [Dysuria] : no dysuria [Poor Libido] : libido not poor [Suicidal] : not suicidal [Depression] : no depression [FreeTextEntry2] : intentional wt loss  [FreeTextEntry9] : chronic left knee pain "bone on bone"

## 2019-06-11 NOTE — HISTORY OF PRESENT ILLNESS
[FreeTextEntry1] : Follow-up [de-identified] : Patient comes for 6 month follow-up visit.\par \par He had coronary angiogram in 4/19 at [facility] which revealed blockage in RCA s/p stent insertion. He is now on Plavix and will remain on it for at least 1 yr. He has improved the diet and increased the exercise. Playing multiple rounds of golf a week, always walks course. He has lost some weight. \par \par In terms of his seizure disorder, he was started on Vimpat in 3/19. The Topamax was tapered off. He notes now having mild left hand tremor since starting Vimpat. \par \par He has chronic left knee pain from severe OA. Following with orthopedist at [facility], recently received cortisone injection which provided temporary relief. He hopes to soon receive Synvisc injection. He knows he will eventually need total knee replacement.

## 2019-06-11 NOTE — PHYSICAL EXAM
[Well Nourished] : well nourished [No Acute Distress] : no acute distress [Well Developed] : well developed [Well-Appearing] : well-appearing [Normal Voice/Communication] : normal voice/communication [PERRL] : pupils equal round and reactive to light [Normal Sclera/Conjunctiva] : normal sclera/conjunctiva [Normal Outer Ear/Nose] : the outer ears and nose were normal in appearance [EOMI] : extraocular movements intact [Normal Oropharynx] : the oropharynx was normal [Supple] : supple [No Lymphadenopathy] : no lymphadenopathy [Normal TMs] : both tympanic membranes were normal [No Respiratory Distress] : no respiratory distress  [Thyroid Normal, No Nodules] : the thyroid was normal and there were no nodules present [Clear to Auscultation] : lungs were clear to auscultation bilaterally [Normal Rate] : normal rate  [No Accessory Muscle Use] : no accessory muscle use [Normal S1, S2] : normal S1 and S2 [Regular Rhythm] : with a regular rhythm [Normal Appearance] : normal in appearance [No Edema] : there was no peripheral edema [No Murmur] : no murmur heard [Soft] : abdomen soft [Non Tender] : non-tender [Non-distended] : non-distended [Penis Abnormality] : normal uncircumcised penis [Scrotum] : the scrotum was normal [No Rash] : no rash [Testes Tenderness] : no tenderness of the testes [No Joint Swelling] : no joint swelling [Grossly Normal Strength/Tone] : grossly normal strength/tone [Coordination Grossly Intact] : coordination grossly intact [Normal Gait] : normal gait [Deep Tendon Reflexes (DTR)] : deep tendon reflexes were 2+ and symmetric [No Focal Deficits] : no focal deficits [Normal Mood] : the mood was normal [Normal Insight/Judgement] : insight and judgment were intact [Normal Affect] : the affect was normal [Acne] : no acne [de-identified] : left knee crepitus

## 2019-06-18 ENCOUNTER — TRANSCRIPTION ENCOUNTER (OUTPATIENT)
Age: 69
End: 2019-06-18

## 2019-06-18 LAB
ALBUMIN SERPL ELPH-MCNC: 4.6 G/DL
ALP BLD-CCNC: 81 U/L
ALT SERPL-CCNC: 21 U/L
ANION GAP SERPL CALC-SCNC: 13 MMOL/L
AST SERPL-CCNC: 23 U/L
BASOPHILS # BLD AUTO: 0.01 K/UL
BASOPHILS NFR BLD AUTO: 0.2 %
BILIRUB SERPL-MCNC: 0.9 MG/DL
BUN SERPL-MCNC: 18 MG/DL
CALCIUM SERPL-MCNC: 9.8 MG/DL
CHLORIDE SERPL-SCNC: 101 MMOL/L
CHOLEST SERPL-MCNC: 159 MG/DL
CHOLEST/HDLC SERPL: 4.5 RATIO
CO2 SERPL-SCNC: 24 MMOL/L
CREAT SERPL-MCNC: 0.93 MG/DL
EOSINOPHIL # BLD AUTO: 0.08 K/UL
EOSINOPHIL NFR BLD AUTO: 1.4 %
ESTIMATED AVERAGE GLUCOSE: 105 MG/DL
GLUCOSE SERPL-MCNC: 113 MG/DL
HBA1C MFR BLD HPLC: 5.3 %
HCT VFR BLD CALC: 43.4 %
HDLC SERPL-MCNC: 35 MG/DL
HGB BLD-MCNC: 14.8 G/DL
IMM GRANULOCYTES NFR BLD AUTO: 0.7 %
LDLC SERPL CALC-MCNC: 101 MG/DL
LYMPHOCYTES # BLD AUTO: 1.25 K/UL
LYMPHOCYTES NFR BLD AUTO: 22.4 %
MAN DIFF?: NORMAL
MCHC RBC-ENTMCNC: 31.2 PG
MCHC RBC-ENTMCNC: 34.1 GM/DL
MCV RBC AUTO: 91.6 FL
MONOCYTES # BLD AUTO: 0.65 K/UL
MONOCYTES NFR BLD AUTO: 11.6 %
NEUTROPHILS # BLD AUTO: 3.56 K/UL
NEUTROPHILS NFR BLD AUTO: 63.7 %
PLATELET # BLD AUTO: 182 K/UL
POTASSIUM SERPL-SCNC: 4.4 MMOL/L
PROT SERPL-MCNC: 7.4 G/DL
RBC # BLD: 4.74 M/UL
RBC # FLD: 11.5 %
SODIUM SERPL-SCNC: 138 MMOL/L
TRIGL SERPL-MCNC: 115 MG/DL
WBC # FLD AUTO: 5.59 K/UL

## 2019-06-20 ENCOUNTER — TRANSCRIPTION ENCOUNTER (OUTPATIENT)
Age: 69
End: 2019-06-20

## 2019-07-22 NOTE — H&P PST ADULT - BLOOD AVOIDANCE/RESTRICTIONS, PROFILE
Pre procedure instructions reviewed with Patient.  Colonoscopy, MAC procedure 07/25/2019.    home post-op and over night stay personnel- Anat[facility].  Patient verbalized understanding per Dr. England's instructions and the Anesthesia department.    
none

## 2019-07-23 ENCOUNTER — APPOINTMENT (OUTPATIENT)
Dept: NEUROLOGY | Facility: CLINIC | Age: 69
End: 2019-07-23
Payer: MEDICARE

## 2019-07-23 VITALS
WEIGHT: 220 LBS | SYSTOLIC BLOOD PRESSURE: 126 MMHG | DIASTOLIC BLOOD PRESSURE: 84 MMHG | HEIGHT: 71 IN | HEART RATE: 84 BPM | BODY MASS INDEX: 30.8 KG/M2

## 2019-07-23 DIAGNOSIS — G25.1 DRUG-INDUCED TREMOR: ICD-10-CM

## 2019-07-23 PROCEDURE — 99214 OFFICE O/P EST MOD 30 MIN: CPT

## 2019-07-23 NOTE — HISTORY OF PRESENT ILLNESS
[FreeTextEntry1] : Cardiologist - Shola Quinteros MD - [phone]\par \par *** 07/23/2019  ***\par Mr. CARL VALDES returns for scheduled follow-up appointment. Mr. VALDES reports that in the interval since his last visit, he is doing well. No interval "funny feeling", but he has noticed fine tremor since starting lacosamide. He feels it does not interfere with him and he is otherwise happy with lacosamide. \par \par He has a number of stressors - 39y daughter had hip replacement that dislocated, when replaced developed staph infection now in IV Abx x 6wk. Mother has dementia and was delirious and taken to [facility]. 2nd daughter is pregnant - driving back to [place] with her. \par \par *** 04/23/2019  ***\par Mr. VALDES is tolerating lacosamide well, no problems. He is eager to taper off topiramate. Last week he had coronary artery stents placed. Mr. VALDES is currently on dual antiplatelet therapy, having addid Plavix to ASA. \par \par *** 03/26/2019 ***\par  Mr. VALDES had L carotide endarterectomy. Still bothered by surgey scar.  he recalls that aura was one of anxiety. Had one again the other night. Caring for a ill mother - dementia-  that causes a lot of stress. Aura may occur a few times a year.  This has been his pattern over years. The aura feeling is described as "my mind wanders"; he was driving yesterday and decided to pull over. Feeling lasts a few minutes. \par \par *** 10/31/2018 ***\par 68y M previously seen by Dr. Vega.  Dx with R frontal convexity meningioma after presenting with seizure in 2001. Resected by Dr. Porras shortly after it was found.  Initially on Dilantin, but developed rash and there was also concern for hepatotoxicity (prior h/o ETOHA with liver failure?). He was eventually put on topiramate 100 q12 and has been seizure free for last seizure 8 years.  His aura was a "strange feeling"  that a seizure was going to happen.\par

## 2019-07-23 NOTE — ASSESSMENT
[FreeTextEntry1] : Mr. VALDES developed seizures in setting of meningioma. He has been free of convulsions - and perhaps seizures - for many years. However, he endorsed a "strange" feeling he gets a few times a year lasting a minute or two. He started lacosamide in March, to see if "strange feeling" was suppressed. So far, he feels very well on lacosamide, but has noticed fine tremor. \par \par Plan:\par 1. continue Vimpat 100 bid.\par 2. RTC 4 mo. \par 3. recheck spiral drawing next visit.

## 2019-07-23 NOTE — PHYSICAL EXAM
[FreeTextEntry1] : alert and oriented x 3, speech fluent, names easily, follows requests, good recall for recent and remote events.\par EOM full without sustained nystagmus, PERRL, face symmetrical, no dysarthria\par Motor - full strength in all extremities. normal rapid-alternating movements.\par Sensory - intact LT bilaterally\par Coord - fine action tremor, no ataxia\par Gait - stands without difficulty, normal gait. \par \par spiral drawing show small amplitude tremor bilaterally.

## 2019-09-12 ENCOUNTER — APPOINTMENT (OUTPATIENT)
Dept: ORTHOPEDIC SURGERY | Facility: CLINIC | Age: 69
End: 2019-09-12
Payer: MEDICARE

## 2019-09-12 DIAGNOSIS — R22.31 LOCALIZED SWELLING, MASS AND LUMP, RIGHT UPPER LIMB: ICD-10-CM

## 2019-09-12 PROCEDURE — 99213 OFFICE O/P EST LOW 20 MIN: CPT

## 2019-09-12 PROCEDURE — 73130 X-RAY EXAM OF HAND: CPT | Mod: RT

## 2019-09-12 NOTE — CONSULT LETTER
[Dear  ___] : Dear  [unfilled], [Courtesy Letter:] : I had the pleasure of seeing your patient, [unfilled], in my office today. [Please see my note below.] : Please see my note below. [Sincerely,] : Sincerely, [FreeTextEntry3] : Macario Johnston MD  [FreeTextEntry2] : ROSINA COOPER

## 2019-09-12 NOTE — PHYSICAL EXAM
[de-identified] : Patient is WDWN, alert, and in no acute distress. Breathing is unlabored. He is grossly oriented to person, place, and time. \par \par Right hand: firm soft tissue mass over the second metacarpal. There is no tenderness to palpation or pain with axial compression. There is full arc of motion in the fingers. All intrinsic and extrinsic hand muscles 5/5. No joint instability on provocative testing. Sensation is intact to light touch.  [de-identified] : AP, lateral and oblique views of the right hand were obtained today and revealed no obvious fx or dislocation. There was widening of the scaphoid lunate interval with arthritic changes in the radial scaphoid.

## 2019-09-12 NOTE — ADDENDUM
[FreeTextEntry1] : I, Marta Rg wrote this note acting as a scribe for Dr. Macario Johnston on Sep 12, 2019.

## 2019-09-12 NOTE — END OF VISIT
[FreeTextEntry3] : I, Macario Johnston MD, ordering physician, have read and attest that all the information, medical decision making and discharge instructions within are true and accurate.

## 2019-09-12 NOTE — DISCUSSION/SUMMARY
[FreeTextEntry1] : The underlying pathophysiology was reviewed with the patient. Treatment options were discussed including; observation, and surgical intervention. \par \par I informed the pt that he has a firm soft tissue mass that is most likely benign. \par If the pt does not have pain, and if the mass does not grow, I advised him that surgical treatment is not recommended. \par \par FU PRN

## 2019-09-17 ENCOUNTER — APPOINTMENT (OUTPATIENT)
Dept: INTERNAL MEDICINE | Facility: CLINIC | Age: 69
End: 2019-09-17
Payer: MEDICARE

## 2019-09-17 VITALS
OXYGEN SATURATION: 94 % | WEIGHT: 217 LBS | HEIGHT: 71 IN | BODY MASS INDEX: 30.38 KG/M2 | SYSTOLIC BLOOD PRESSURE: 120 MMHG | DIASTOLIC BLOOD PRESSURE: 82 MMHG | TEMPERATURE: 98.3 F

## 2019-09-17 DIAGNOSIS — Z23 ENCOUNTER FOR IMMUNIZATION: ICD-10-CM

## 2019-09-17 PROCEDURE — G0008: CPT

## 2019-09-17 PROCEDURE — 99214 OFFICE O/P EST MOD 30 MIN: CPT | Mod: 25

## 2019-09-17 PROCEDURE — 90662 IIV NO PRSV INCREASED AG IM: CPT

## 2019-09-17 NOTE — REVIEW OF SYSTEMS
[Fatigue] : no fatigue [Vision Problems] : no vision problems [Palpitations] : no palpitations [Shortness Of Breath] : no shortness of breath [Abdominal Pain] : no abdominal pain [Nausea] : no nausea [Constipation] : no constipation [Diarrhea] : no diarrhea [Vomiting] : no vomiting [Joint Pain] : no joint pain [Headache] : no headache

## 2019-09-17 NOTE — HEALTH RISK ASSESSMENT
[No falls in past year] : Patient reported no falls in the past year [de-identified] : smoked cigars in the past

## 2019-09-17 NOTE — PHYSICAL EXAM
[No Acute Distress] : no acute distress [Well Nourished] : well nourished [Well Developed] : well developed [Well-Appearing] : well-appearing [Thyroid Normal, No Nodules] : the thyroid was normal and there were no nodules present [Normal TMs] : both tympanic membranes were normal [No Respiratory Distress] : no respiratory distress  [Clear to Auscultation] : lungs were clear to auscultation bilaterally [Regular Rhythm] : with a regular rhythm [Normal Rate] : normal rate  [Normal S1, S2] : normal S1 and S2 [No Edema] : there was no peripheral edema [No Murmur] : no murmur heard [Soft] : abdomen soft [Normal Supraclavicular Nodes] : no supraclavicular lymphadenopathy [No CVA Tenderness] : no CVA  tenderness [No Joint Swelling] : no joint swelling [No Rash] : no rash [Speech Grossly Normal] : speech grossly normal [Coordination Grossly Intact] : coordination grossly intact [Normal Affect] : the affect was normal [Normal Mood] : the mood was normal [Normal Insight/Judgement] : insight and judgment were intact [de-identified] : well healed scars over carotid bilaterally

## 2019-09-17 NOTE — ASSESSMENT
[FreeTextEntry1] : HCM\par Physical and labs in 12/2019\par Flu shot given today\par pneumovax/prevnar utd\par The patient will consider Tdap and shingrix\par Cardio fuv

## 2019-09-17 NOTE — HISTORY OF PRESENT ILLNESS
[FreeTextEntry1] : Here for a first visit, bp check and flu shot [de-identified] : CARL VALDES is a 68 yo man with hypertension, hypercholesterolemia, CAD s/p 3 stents 4/2019, BPH, bilateral carotid endarterectomy (left, 12/2018) here for a bp check.  He has been well overall\par \par The patient had been seeing cardiologist Dr BALAJI Salomon and Dr JOSE Armendariz.  He requests a new cardiologist.\par \par The patient has an appointment with pulmonology Dr RYLEE Garcia tomorrow to evaluate for sleep apnea.\par \par The patient recently saw his dermatologist.  He also saw a hand surgeon for a right dorsal ganglion cyst.\par \par The patient had a colonoscopy last year, reportedly normal.  repeat colonoscopy due in 8 years.\par \par The patient is  with two daughter.  His daughter Tory lives locally.  The patient is now retired.  he used to own bicycle stores.  He has an active lifestyle and plays golf regularly.

## 2019-11-01 ENCOUNTER — MEDICATION RENEWAL (OUTPATIENT)
Age: 69
End: 2019-11-01

## 2019-11-02 ENCOUNTER — RX RENEWAL (OUTPATIENT)
Age: 69
End: 2019-11-02

## 2019-12-07 ENCOUNTER — APPOINTMENT (OUTPATIENT)
Dept: INTERNAL MEDICINE | Facility: CLINIC | Age: 69
End: 2019-12-07
Payer: MEDICARE

## 2019-12-07 PROCEDURE — 36415 COLL VENOUS BLD VENIPUNCTURE: CPT

## 2019-12-10 ENCOUNTER — TRANSCRIPTION ENCOUNTER (OUTPATIENT)
Age: 69
End: 2019-12-10

## 2019-12-10 LAB
25(OH)D3 SERPL-MCNC: 38.1 NG/ML
ALBUMIN SERPL ELPH-MCNC: 4.5 G/DL
ALP BLD-CCNC: 74 U/L
ALT SERPL-CCNC: 21 U/L
ANION GAP SERPL CALC-SCNC: 12 MMOL/L
APPEARANCE: CLEAR
AST SERPL-CCNC: 19 U/L
BASOPHILS # BLD AUTO: 0.02 K/UL
BASOPHILS NFR BLD AUTO: 0.4 %
BILIRUB SERPL-MCNC: 1.1 MG/DL
BILIRUBIN URINE: NEGATIVE
BLOOD URINE: NEGATIVE
BUN SERPL-MCNC: 15 MG/DL
CALCIUM SERPL-MCNC: 9.5 MG/DL
CHLORIDE SERPL-SCNC: 101 MMOL/L
CHOLEST SERPL-MCNC: 140 MG/DL
CHOLEST/HDLC SERPL: 4.1 RATIO
CO2 SERPL-SCNC: 26 MMOL/L
COLOR: YELLOW
CREAT SERPL-MCNC: 1.01 MG/DL
EOSINOPHIL # BLD AUTO: 0.08 K/UL
EOSINOPHIL NFR BLD AUTO: 1.4 %
ESTIMATED AVERAGE GLUCOSE: 105 MG/DL
GLUCOSE QUALITATIVE U: NEGATIVE
GLUCOSE SERPL-MCNC: 104 MG/DL
HBA1C MFR BLD HPLC: 5.3 %
HCT VFR BLD CALC: 41.4 %
HDLC SERPL-MCNC: 34 MG/DL
HGB BLD-MCNC: 14 G/DL
IMM GRANULOCYTES NFR BLD AUTO: 0.4 %
KETONES URINE: NEGATIVE
LDLC SERPL CALC-MCNC: 73 MG/DL
LEUKOCYTE ESTERASE URINE: NEGATIVE
LYMPHOCYTES # BLD AUTO: 1.7 K/UL
LYMPHOCYTES NFR BLD AUTO: 29.9 %
MAN DIFF?: NORMAL
MCHC RBC-ENTMCNC: 30.4 PG
MCHC RBC-ENTMCNC: 33.8 GM/DL
MCV RBC AUTO: 90 FL
MONOCYTES # BLD AUTO: 0.64 K/UL
MONOCYTES NFR BLD AUTO: 11.2 %
NEUTROPHILS # BLD AUTO: 3.23 K/UL
NEUTROPHILS NFR BLD AUTO: 56.7 %
NITRITE URINE: NEGATIVE
PH URINE: 6
PLATELET # BLD AUTO: 159 K/UL
POTASSIUM SERPL-SCNC: 4.6 MMOL/L
PROT SERPL-MCNC: 7.1 G/DL
PROTEIN URINE: NEGATIVE
PSA SERPL-MCNC: 0.86 NG/ML
RBC # BLD: 4.6 M/UL
RBC # FLD: 11.7 %
SODIUM SERPL-SCNC: 139 MMOL/L
SPECIFIC GRAVITY URINE: 1.02
TRIGL SERPL-MCNC: 167 MG/DL
TSH SERPL-ACNC: 3.39 UIU/ML
UROBILINOGEN URINE: NORMAL
WBC # FLD AUTO: 5.69 K/UL

## 2019-12-12 ENCOUNTER — APPOINTMENT (OUTPATIENT)
Dept: INTERNAL MEDICINE | Facility: CLINIC | Age: 69
End: 2019-12-12
Payer: MEDICARE

## 2019-12-12 VITALS
SYSTOLIC BLOOD PRESSURE: 112 MMHG | DIASTOLIC BLOOD PRESSURE: 70 MMHG | WEIGHT: 220 LBS | HEART RATE: 75 BPM | TEMPERATURE: 98.5 F | BODY MASS INDEX: 30.8 KG/M2 | HEIGHT: 71 IN

## 2019-12-12 DIAGNOSIS — N40.0 BENIGN PROSTATIC HYPERPLASIA WITHOUT LOWER URINARY TRACT SYMPMS: ICD-10-CM

## 2019-12-12 DIAGNOSIS — I77.9 DISORDER OF ARTERIES AND ARTERIOLES, UNSPECIFIED: ICD-10-CM

## 2019-12-12 DIAGNOSIS — Z00.00 ENCOUNTER FOR GENERAL ADULT MEDICAL EXAMINATION W/OUT ABNORMAL FINDINGS: ICD-10-CM

## 2019-12-12 PROCEDURE — G0439: CPT

## 2019-12-12 PROCEDURE — G0444 DEPRESSION SCREEN ANNUAL: CPT | Mod: 59

## 2019-12-12 RX ORDER — AMLODIPINE BESYLATE 2.5 MG/1
2.5 TABLET ORAL DAILY
Qty: 30 | Refills: 2 | Status: DISCONTINUED | COMMUNITY
Start: 2018-10-22 | End: 2019-12-12

## 2019-12-12 NOTE — PHYSICAL EXAM
[No Acute Distress] : no acute distress [Well-Appearing] : well-appearing [Well Developed] : well developed [Well Nourished] : well nourished [Normal Sclera/Conjunctiva] : normal sclera/conjunctiva [Normal Outer Ear/Nose] : the outer ears and nose were normal in appearance [EOMI] : extraocular movements intact [Normal Oropharynx] : the oropharynx was normal [Normal TMs] : both tympanic membranes were normal [No JVD] : no jugular venous distention [Normal Nasal Mucosa] : the nasal mucosa was normal [No Lymphadenopathy] : no lymphadenopathy [Supple] : supple [Thyroid Normal, No Nodules] : the thyroid was normal and there were no nodules present [No Respiratory Distress] : no respiratory distress  [Clear to Auscultation] : lungs were clear to auscultation bilaterally [Normal Rate] : normal rate  [Regular Rhythm] : with a regular rhythm [Normal S1, S2] : normal S1 and S2 [No Murmur] : no murmur heard [No Edema] : there was no peripheral edema [Soft] : abdomen soft [Non Tender] : non-tender [No Masses] : no abdominal mass palpated [Non-distended] : non-distended [Normal Bowel Sounds] : normal bowel sounds [Normal Supraclavicular Nodes] : no supraclavicular lymphadenopathy [Normal Axillary Nodes] : no axillary lymphadenopathy [Normal Posterior Cervical Nodes] : no posterior cervical lymphadenopathy [Normal Anterior Cervical Nodes] : no anterior cervical lymphadenopathy [No Joint Swelling] : no joint swelling [No CVA Tenderness] : no CVA  tenderness [Coordination Grossly Intact] : coordination grossly intact [No Rash] : no rash [No Focal Deficits] : no focal deficits [Normal Gait] : normal gait [Speech Grossly Normal] : speech grossly normal [Alert and Oriented x3] : oriented to person, place, and time [Normal Affect] : the affect was normal [Normal Mood] : the mood was normal [Normal Insight/Judgement] : insight and judgment were intact [de-identified] : well healed scars over carotid bilaterally

## 2019-12-12 NOTE — ASSESSMENT
[FreeTextEntry1] : HCM\par Labs utd\par Consider shingrix\par continue current meds\par BP check in 6 months

## 2019-12-12 NOTE — REVIEW OF SYSTEMS
[Fever] : no fever [Vision Problems] : no vision problems [Chest Pain] : no chest pain [Nasal Discharge] : no nasal discharge [Shortness Of Breath] : no shortness of breath [Abdominal Pain] : no abdominal pain [Skin Rash] : no skin rash [Headache] : no headache [Depression] : no depression

## 2019-12-12 NOTE — HISTORY OF PRESENT ILLNESS
[FreeTextEntry1] : Annual Physical [de-identified] : CARL VALDES is a 70 yo man with hypertension, hypercholesterolemia, CAD s/p 3 stents 4/2019, BPH, bilateral carotid endarterectomy (left, 12/2018) here for a physical.  He has been well overall\par \par The patient had been seeing cardiologist Dr BALAJI Salomon and Dr JOSE Armendariz.  He had an ekg and visit with him approx 6 weeks ago\par \par The patient has an appointment with pulmonology Dr RYLEE Garcia for sleep apnea.\par \par The patient recently saw his dermatologist.  He also saw a hand surgeon for a right dorsal ganglion cyst.\par \par The patient had a colonoscopy last year, reportedly normal.  repeat colonoscopy due in 8 years.\par \par The patient is  with two daughter.  His daughter Tory lives locally.  The patient is now retired.  he used to own bicycle stores.  He has an active lifestyle and plays golf regularly.

## 2019-12-12 NOTE — HEALTH RISK ASSESSMENT
[Good] : ~his/her~  mood as  good [Yes] : Yes [Monthly or less (1 pt)] : Monthly or less (1 point) [No] : In the past 12 months have you used drugs other than those required for medical reasons? No [No falls in past year] : Patient reported no falls in the past year [0] : 2) Feeling down, depressed, or hopeless: Not at all (0) [None] : None [Retired] : retired [Alone] : lives alone [Fully functional (bathing, dressing, toileting, transferring, walking, feeding)] : Fully functional (bathing, dressing, toileting, transferring, walking, feeding) [Feels Safe at Home] : Feels safe at home [Fully functional (using the telephone, shopping, preparing meals, housekeeping, doing laundry, using] : Fully functional and needs no help or supervision to perform IADLs (using the telephone, shopping, preparing meals, housekeeping, doing laundry, using transportation, managing medications and managing finances) [Smoke Detector] : smoke detector [Carbon Monoxide Detector] : carbon monoxide detector [Seat Belt] :  uses seat belt [With Patient/Caregiver] : With Patient/Caregiver [Name: ___] : Health Care Proxy's Name: [unfilled]  [Designated Healthcare Proxy] : Designated healthcare proxy [Relationship: ___] : Relationship: [unfilled] [] : No [de-identified] : swims regularly for exercise [[id number]] : 0 [Reports changes in hearing] : Reports no changes in hearing [Reports changes in vision] : Reports no changes in vision [Reports changes in dental health] : Reports no changes in dental health [Guns at Home] : no guns at home [AdvancecareDate] : 12/19

## 2020-01-22 ENCOUNTER — APPOINTMENT (OUTPATIENT)
Dept: UROLOGY | Facility: CLINIC | Age: 70
End: 2020-01-22
Payer: MEDICARE

## 2020-01-22 DIAGNOSIS — N52.9 MALE ERECTILE DYSFUNCTION, UNSPECIFIED: ICD-10-CM

## 2020-01-22 LAB
ESTRADIOL SERPL-MCNC: 19 PG/ML
PROLACTIN SERPL-MCNC: 6.1 NG/ML

## 2020-01-22 PROCEDURE — 99203 OFFICE O/P NEW LOW 30 MIN: CPT

## 2020-01-22 RX ORDER — TADALAFIL 20 MG/1
20 TABLET ORAL
Qty: 10 | Refills: 0 | Status: ACTIVE | COMMUNITY
Start: 2020-01-22 | End: 1900-01-01

## 2020-01-22 NOTE — REVIEW OF SYSTEMS
[Dry Eyes] : dryness of the eyes [Chest Pain] : chest pain [Poor quality erections] : Poor quality erections [No erections] : no erections [Leakage of urine with urgency] : leakage of urine with urgency [Joint Pain] : joint pain [Negative] : Musculoskeletal

## 2020-01-22 NOTE — HISTORY OF PRESENT ILLNESS
[Currently Experiencing ___] :  [unfilled] [Nocturia] : nocturia [FreeTextEntry1] : 69 year  retired \par in 2001 after craniotomy treated with topamax for seizure\par developed sexual dysfunction then switched vimpat with resolution.   \par currently in new relationship\par unable to have sexual intercourse\par patient unable to achieve erection during sexual intercourse\par viagra 50 mg effective erection (8 hours after taking)\par able to have sexual intercourse\par  [Urinary Urgency] : no urinary urgency [Urinary Frequency] : no urinary frequency [Straining] : no straining [Weak Stream] : no weak stream

## 2020-01-22 NOTE — PHYSICAL EXAM
[General Appearance - Well Developed] : well developed [General Appearance - Well Nourished] : well nourished [Normal Appearance] : normal appearance [Well Groomed] : well groomed [General Appearance - In No Acute Distress] : no acute distress [Edema] : no peripheral edema [Exaggerated Use Of Accessory Muscles For Inspiration] : no accessory muscle use [Respiration, Rhythm And Depth] : normal respiratory rhythm and effort [Bowel Sounds] : normal bowel sounds [Abdomen Tenderness] : non-tender [Abdomen Mass (___ Cm)] : no abdominal mass palpated [Abdomen Hernia] : no hernia was discovered [Costovertebral Angle Tenderness] : no ~M costovertebral angle tenderness [Urethral Meatus] : meatus normal [Penis Abnormality] : normal uncircumcised penis [Epididymis] : the epididymides were normal [Testes Tenderness] : no tenderness of the testes [Testes Mass (___cm)] : there were no testicular masses [Prostate Enlargement] : the prostate was not enlarged [Prostate Tenderness] : the prostate was not tender [No Prostate Nodules] : no prostate nodules [] : no rash [Normal Station and Gait] : the gait and station were normal for the patient's age [No Focal Deficits] : no focal deficits [Oriented To Time, Place, And Person] : oriented to person, place, and time [Inguinal Lymph Nodes Enlarged Bilaterally] : inguinal [FreeTextEntry1] : 11cm

## 2020-01-22 NOTE — ASSESSMENT
[FreeTextEntry1] : Mr. Williamson is a 61.  Mr. Williamson is a 69-year-old retired .  He is .  He has a significant past medical history for brain tumor and arteriosclerotic heart disease.  He experienced sexual dysfunction in his prior marriage.  He believes this led to the dissolution of his marriage.  Subsequently he has had sexual partners and experiences "performance anxiety".  He endorses the fact that he is able to masturbate with a good erection.  He responds to pornography.  He has taken Viagra with good results however the results appear to be delayed he also experiences headaches from this.  He has had no endocrinologic evaluation.  His physical examination demonstrates good neurologic and vascular findings.\par \par At this point I plan to park on hormonal evaluation..\par \par In light of his response to Viagra I think it would be worthwhile to attempt tadalafil.\par Discussed PD5-I possible side effects, onset of action, duration of action, and food interactions.  Discussed behavioral strategies to optimize efficacy of medication.  \par DIscussed the potential advantages and disadvantages as well as side effect profiles of each.\par Warned re priapism and need for intervention to prevent permanent penile injury,\par \par

## 2020-01-24 ENCOUNTER — APPOINTMENT (OUTPATIENT)
Dept: NEUROLOGY | Facility: CLINIC | Age: 70
End: 2020-01-24
Payer: MEDICARE

## 2020-01-24 VITALS
SYSTOLIC BLOOD PRESSURE: 141 MMHG | HEART RATE: 67 BPM | BODY MASS INDEX: 31.36 KG/M2 | DIASTOLIC BLOOD PRESSURE: 95 MMHG | HEIGHT: 71 IN | WEIGHT: 224 LBS

## 2020-01-24 DIAGNOSIS — D32.9 BENIGN NEOPLASM OF MENINGES, UNSPECIFIED: ICD-10-CM

## 2020-01-24 PROCEDURE — 99213 OFFICE O/P EST LOW 20 MIN: CPT

## 2020-01-24 RX ORDER — AMLODIPINE BESYLATE 2.5 MG/1
2.5 TABLET ORAL DAILY
Refills: 0 | Status: ACTIVE | COMMUNITY
Start: 2019-12-12

## 2020-01-24 NOTE — ASSESSMENT
[FreeTextEntry1] : Mr. VALDES developed seizures in setting of meningioma diagnosed and removed in 2001. He has been free of convulsions - and perhaps seizures - for many years. However, he endorsed a "strange" feeling he gets a few times a year lasting a minute or two. He started lacosamide in March, to see if "strange feeling" was suppressed. So far, he feels very well on lacosamide, but has noticed fine tremor, unchanged since last visit. \par We will change frequency of surveillance MRI to every other year. Next scan March 2021. \par \par Plan:\par 1. continue Vimpat 100 bid - Mr. VALDES prefers 90d supply.\par 2. RTC 6 mo. \par 3. recheck spiral drawing next visit. \par 4. repeat MRI + gado next March 2021.

## 2020-01-24 NOTE — HISTORY OF PRESENT ILLNESS
[FreeTextEntry1] : Cardiologist - Shola Quinteros MD - [phone]\par *** 01/24/2020  ***\par Mr. CARL VALDES returns for scheduled follow-up appointment. Mr. VALDES reports that in the interval since his last visit, he is doing well. No interval seizures. Last MRI on March 14, 2019 for f/u meningioma from 2000s showed no disease. Tremor unchanged from last visit - not interfering. Mr. VALDES notes that occasionally he become horse when using his voice a lot - problem present since carotid endarterectomy.  Also has some numbness of skin over carotid.  \par \par *** 07/23/2019  ***\par Mr. CARL VALDES returns for scheduled follow-up appointment. Mr. VALDES reports that in the interval since his last visit, he is doing well. No interval "funny feeling", but he has noticed fine tremor since starting lacosamide. He feels it does not interfere with him and he is otherwise happy with lacosamide. \par \par He has a number of stressors - 39y daughter had hip replacement that dislocated, when replaced developed staph infection now in IV Abx x 6wk. Mother has dementia and was delirious and taken to [facility]. 2nd daughter is pregnant - driving back to [place] with her. \par \par *** 04/23/2019  ***\par Mr. VALDES is tolerating lacosamide well, no problems. He is eager to taper off topiramate. Last week he had coronary artery stents placed. Mr. VALDES is currently on dual antiplatelet therapy, having addid Plavix to ASA. \par \par *** 03/26/2019 ***\par  Mr. VALDES had L carotide endarterectomy. Still bothered by surgey scar.  he recalls that aura was one of anxiety. Had one again the other night. Caring for a ill mother - dementia-  that causes a lot of stress. Aura may occur a few times a year.  This has been his pattern over years. The aura feeling is described as "my mind wanders"; he was driving yesterday and decided to pull over. Feeling lasts a few minutes. \par \par *** 10/31/2018 ***\par 68y M previously seen by Dr. Vega.  Dx with R frontal convexity meningioma after presenting with seizure in 2001. Resected by Dr. Porras shortly after it was found.  Initially on Dilantin, but developed rash and there was also concern for hepatotoxicity (prior h/o ETOHA with liver failure?). He was eventually put on topiramate 100 q12 and has been seizure free for last seizure 8 years.  His aura was a "strange feeling"  that a seizure was going to happen.\par

## 2020-01-29 ENCOUNTER — TRANSCRIPTION ENCOUNTER (OUTPATIENT)
Age: 70
End: 2020-01-29

## 2020-01-29 LAB
TESTOST BND SERPL-MCNC: 7.1 PG/ML
TESTOST SERPL-MCNC: 346.3 NG/DL

## 2020-02-18 NOTE — H&P PST ADULT - LYMPHATIC
ED Medical Screen (RME)





- General


Chief Complaint: Diarrhea


Stated Complaint: DIAHERRA/ABDOMINAL CRAMPING/ BACK PAIN


Time Seen by Provider: 02/18/20 15:53


Primary Care Provider: 


DEWEY CLEMENTE MD [Primary Care Provider] - Follow up as needed


TRAVEL OUTSIDE OF THE [place] IN LAST 30 DAYS: Yes


COUNTRY TRAVELED TO/FROM: [facility]


Notes: 





02/18/20 16:05


54-year-old female with a history of C. difficile, diverticulitis and MRSA 

presents emergency room for complaints of lower abdominal pain with diarrhea.  

Patient has been placed on Bactrim and Keflex to treat underarm cyst that were 

lanced on February 12.  Patient states that her stools become progressively 

loose and now she is diarrhea.  Patient states it seems similar to her last 

episode of C. difficile.  Patient states there is an infant at the house so she 

wants to get checked out.  Patient reports abdominal pain which she is concerned

may also be diverticulitis.  She has had 4 episodes of diarrhea today.  Denies 

chest pain or shortness of breath. 





I have greeted and performed a rapid initial assessment of this patient.  A 

comprehensive ED assessment and evaluation of the patient, analysis of test 

results and completion of the medical decision making process will be conducted 

by additional ED providers.





PHYSICAL EXAMINATION:





GENERAL: Well-appearing, well-nourished and in no acute distress.





HEAD: Atraumatic, normocephalic.





EYES: Pupils equal round extraocular movements intact,  conjunctiva are normal.





CV: s1, s2 regular 


abd: Right lower quadrant left lower quadrant abdominal pain





LUNGS: No respiratory distress





Musculoskeletal: Normal range of motion





NEUROLOGICAL:  Normal speech, normal gait. 





SKIN: Warm, Dry, normal turgor, no rashes or lesions noted.





- Related Data


Allergies/Adverse Reactions: 


                                        





Shellfish * [Shellfish] Allergy (Severe, Verified 02/18/20 15:50)


   Anaphylaxis


iodine Allergy (Verified 02/18/20 15:50)


   











Past Medical History





- Social History


Chew tobacco use (# tins/day): No


Frequency of alcohol use: Rare


Drug Abuse: None





- Past Medical History


Cardiac Medical History: Reports: Hx Hypertension


Pulmonary Medical History: Reports: Hx Asthma - Remote history, Hx COPD, Hx 

Pneumonia


Neurological Medical History: Denies: Hx Cerebrovascular Accident, Hx Seizures


Endocrine Medical History: Reports: Hx Hypothyroidism.  Denies: Hx Graves' 

Disease, Hx Hyperthyroidism


Renal/ Medical History: Denies: Hx Peritoneal Dialysis


Malignancy Medical History: Reports: Hx Cervical Cancer


GI Medical History: Reports: Hx Gastroesophageal Reflux Disease, Hx Irritable 

Bowel, Hx Colonoscopy, Hx Endoscopy


Musculoskeltal Medical History: Reports Hx Arthritis - CERVICAL/BACK DDD, 

Reports Hx Fibromyalgia, Reports Hx Musculoskeletal Deformity, Reports Hx 

Musculoskeletal Trauma


Skin Medical History: Reports Hx Cellulitis, Reports Hx MRSA


Psychiatric Medical History: Reports: Hx Anxiety, Hx Bipolar Disorder, Hx 

Depression, Hx Obsessive Compulsive Disorder


Traumatic Medical History: Reports: Hx Fractures - Foot nose ribs


Infectious Medical History: Reports: Hx MRSA


Past Surgical History: Reports: Hx Appendectomy, Hx Bowel Surgery - RESECTION, 

Hx Hysterectomy, Hx Orthopedic Surgery - Surgery on left foot 2, Hx Tubal 

Ligation





- Immunizations


Immunizations up to date: Yes


Hx Diphtheria, Pertussis, Tetanus Vaccination: No - THINKS SHE HAD IN 2006.





Physical Exam





- Vital signs


Vitals: 


                                        











Temp Pulse Resp BP Pulse Ox


 


 98.5 F   73   16   103/76   100 


 


 02/18/20 15:35  02/18/20 15:35  02/18/20 15:35  02/18/20 15:35  02/18/20 15:35














Course





- Vital Signs


Vital signs: 


                                        











Temp Pulse Resp BP Pulse Ox


 


 98.5 F   73   16   103/76   100 


 


 02/18/20 15:35  02/18/20 15:35  02/18/20 15:35  02/18/20 15:35  02/18/20 15:35














Doctor's Discharge





- Discharge


Referrals: 


DEWEY CLEMENTE MD [Primary Care Provider] - Follow up as needed
Entered by NIDHI YIP SCRIBE  02/18/20 4952 





Acting as scribe for:BA LYNN MD





ED General





- General


Chief Complaint: Diarrhea


Stated Complaint: DIAHERRA/ABDOMINAL CRAMPING/ BACK PAIN


Time Seen by Provider: 02/18/20 15:53


Primary Care Provider: 


DEWEY CLEMENTE MD [Primary Care Provider] - Follow up as needed


Information source: Patient


Notes: 


54-year-old female presents to the emergency department complaining of diarrhea 

that began 4 days ago with the start of her prescribed antibiotics for MRSA. 

Patient reports that it worsened and this morning reports that her diarrhea was 

like "water". Patient reports associated cramping. Patient states that she has 

had c-diff twice from antibiotics in the past. 


TRAVEL OUTSIDE OF THE [place] IN LAST 30 DAYS: Yes


COUNTRY TRAVELED TO/FROM: [place]





- Related Data


Allergies/Adverse Reactions: 


                                        





Shellfish * [Shellfish] Allergy (Severe, Verified 02/18/20 15:50)


   Anaphylaxis


iodine Allergy (Verified 02/18/20 15:50)


   











Past Medical History





- General


Information source: Patient





- Social History


Smoking Status: Current Every Day Smoker


Cigarette use (# per day): Yes - 2-3 cigarettes a day


Chew tobacco use (# tins/day): No


Frequency of alcohol use: Rare


Drug Abuse: None


Family History: Arthritis, CAD, DM, Hyperlipidemia, Hypertension, Malignancy, 

Thyroid Disfunction


Patient has suicidal ideation: No


Patient has homicidal ideation: No





- Past Medical History


Cardiac Medical History: Reports: Hx Hypertension


Pulmonary Medical History: Reports: Hx Asthma - Remote history, Hx COPD, Hx 

Pneumonia


Endocrine Medical History: Reports: Hx Hypothyroidism


Malignancy Medical History: Reports: Hx Cervical Cancer


GI Medical History: Reports: Hx Gastroesophageal Reflux Disease, Hx Irritable 

Bowel, Hx Colonoscopy, Hx Endoscopy


Musculoskeletal Medical History: Reports Hx Arthritis - CERVICAL/BACK DDD, 

Reports Hx Fibromyalgia, Reports Hx Musculoskeletal Deformity, Reports Hx Mu

sculoskeletal Trauma


Skin Medical History: Reports Hx Cellulitis, Reports Hx MRSA


Psychiatric Medical History: Reports: Hx Anxiety, Hx Bipolar Disorder, Hx 

Depression, Hx Obsessive Compulsive Disorder


Traumatic Medical History: Reports: Hx Fractures - Foot nose ribs


Infectious Medical History: Reports: Hx MRSA


Past Surgical History: Reports: Hx Appendectomy, Hx Bowel Surgery - RESECTION, 

Hx Hysterectomy, Hx Orthopedic Surgery - Surgery on left foot 2, Hx Tubal 

Ligation





- Immunizations


Immunizations up to date: Yes


Hx Diphtheria, Pertussis, Tetanus Vaccination: No - THINKS SHE HAD IN 2006.





Review of Systems





- Review of Systems


Constitutional: denies: Fever


EENT: No symptoms reported


Cardiovascular: No symptoms reported


Respiratory: No symptoms reported


Gastrointestinal: See HPI, Diarrhea.  denies: Nausea


Genitourinary: No symptoms reported


Female Genitourinary: No symptoms reported


Musculoskeletal: No symptoms reported


Skin: No symptoms reported


Hematologic/Lymphatic: No symptoms reported


Neurological/Psychological: No symptoms reported


-: Yes All other systems reviewed and negative





Physical Exam





- Vital signs


Vitals: 


                                        











Temp Pulse Resp BP Pulse Ox


 


 98.5 F   73   16   103/76   100 


 


 02/18/20 15:35  02/18/20 15:35  02/18/20 15:35  02/18/20 15:35  02/18/20 15:35














- Notes


Notes: 


Physical Exam:


 


General: Alert, appears well.


 


HEENT: Normocephalic. Atraumatic. PERRL. Extraocular movements intact. 

Oropharynx clear.


 


Neck: Supple. Non-tender.


 


Respiratory: No respiratory distress. Clear and equal breath sounds bilaterally.


 


Cardiovascular: Regular rate and rhythm.


 


Abdominal: Normal Inspection. Soft. Non-tender. No distension. Normal Bowel 

Sounds.


 


Back: No gross abnormalities.


 


Extremities: Moves all four extremities.


Upper extremities: Normal inspection. Normal ROM.  


Lower extremities: Normal inspection. No edema. Normal ROM.


 


Neurological: Normal cognition. AAOx4. Normal speech.  


 


Psychological: Normal affect. Normal Mood.


 


Skin: Warm. Dry. Normal color. 





Course





- Re-evaluation


Re-evalutation: 





02/18/20 20:42


Patient now complains of back pain in her mid back.  States that she has been 

walking up steps more than usual recently denies any other injury.  Splane to 

her the CT scan of her abdomen pelvis did mention at L1 there is a smorls defect

in bone.  No fracture


02/18/20 20:45


Patient has had no diarrhea while in the department.  Plan will be to have 

patient go home and if she has another diarrheal stool she can collect stool 

samples and bring back in the containers to send to the lab.





- Vital Signs


Vital signs: 


                                        











Temp Pulse Resp BP Pulse Ox


 


 98.5 F   73   16   103/76   100 


 


 02/18/20 15:35  02/18/20 15:35  02/18/20 15:35  02/18/20 15:35  02/18/20 15:35














- Laboratory


Result Diagrams: 


                                 02/18/20 16:57





                                 02/18/20 16:57


Laboratory results interpreted by me: 


                                        











  02/18/20 02/18/20





  16:21 16:57


 


RBC   5.46 H


 


MCV   78 L


 


MCH   26.1 L


 


RDW   15.2 H


 


Urine Ketones  TRACE H 


 


Urine Urobilinogen  2.0 H 


 


Ur Leukocyte Esterase  TRACE H 














- Diagnostic Test


Radiology reviewed: Image reviewed, Reports reviewed


Radiology results interpreted by me: 





02/18/20 20:45


Radiology report of CT scan of abdomen and pelvis did not disclose any acute inf

lammatory or obstructive process.  Appendix is not visualized.  A Schmorl's 

deformity was noted on the endplate of L1.





Discharge





- Discharge


Clinical Impression: 


 Diarrhea, Back pain





Condition: Stable


Disposition: HOME, SELF-CARE


Additional Instructions: 


Diarrhea





     Diarrhea means frequent, watery stools. There are many causes. Any problem 

that keeps the intestinal tract from absorbing water from the stool can lead to 

diarrhea. 


     A sudden new diarrhea problem is usually caused by a virus, food 

sensitivity, toxic bacteria, or drugs. In this case, we expect the problem to go

away soon. Testing is done only if you seem seriously ill from the diarrhea.


     If you have chronic diarrhea, or diarrhea that keeps coming back, we need 

to find out why. Chronic diarrhea can be due to inflammation of the bowels such 

as Crohn's disease or ulcerative colitis, food sensitivity such as intolerance 

to lactose or wheat protein, irritable bowel syndrome, and other problems. If 

your diarrhea is a significant problem but it's not clear why you have it, we'll

refer you to a specialist for further testing.


     During an episode of diarrhea, drink small amounts (two to six ounces) of 

clear liquids (soft drinks, sport drinks, herb teas, broth, etc).  Take fluids 

frequently to prevent dehydration. It's usually not a problem to take mild anti-

diarrhea medication such as Kaopectate or Pepto-Bismol. As the diarrhea eases, 

advance to small amounts of bland food (mashed potato, toast) for 24 hours.


     Call the physician if blood appears in your vomit or stool, if vomiting 

lasts longer than 24 hours, if the abdominal pain worsens or becomes localized 

to one area, if you develop high fever, or if you become lightheaded and weak.


Recommend taking Tylenol extra strength 2 tabs twice a day as needed for back 

pain.


Prescriptions: 


Dicyclomine HCl [Bentyl 20 mg Tablet] 20 mg PO QID PRN 5 Days #20 tablet


 PRN Reason: Abdominal Cramping


Referrals: 


DEWEY CLEMENTE MD [Primary Care Provider] - Follow up as needed





I personally performed the services described in the documentation, reviewed and

edited the documentation which was dictated to the scribe in my presence, and it

accurately records my words and actions.
anterior cervical L/anterior cervical R

## 2020-02-26 ENCOUNTER — APPOINTMENT (OUTPATIENT)
Dept: UROLOGY | Facility: CLINIC | Age: 70
End: 2020-02-26

## 2020-03-18 ENCOUNTER — RX RENEWAL (OUTPATIENT)
Age: 70
End: 2020-03-18

## 2020-03-19 ENCOUNTER — RX RENEWAL (OUTPATIENT)
Age: 70
End: 2020-03-19

## 2020-04-22 ENCOUNTER — APPOINTMENT (OUTPATIENT)
Dept: INTERNAL MEDICINE | Facility: CLINIC | Age: 70
End: 2020-04-22
Payer: MEDICARE

## 2020-04-22 DIAGNOSIS — M54.5 LOW BACK PAIN: ICD-10-CM

## 2020-04-22 PROCEDURE — 99213 OFFICE O/P EST LOW 20 MIN: CPT | Mod: 95

## 2020-04-22 RX ORDER — SILDENAFIL 50 MG/1
50 TABLET ORAL
Qty: 20 | Refills: 3 | Status: DISCONTINUED | COMMUNITY
Start: 2018-11-27 | End: 2020-04-22

## 2020-04-22 NOTE — PHYSICAL EXAM
[Well Nourished] : well nourished [No Acute Distress] : no acute distress [Well Developed] : well developed [Well-Appearing] : well-appearing [Memory Grossly Normal] : memory grossly normal [Speech Grossly Normal] : speech grossly normal [No Respiratory Distress] : no respiratory distress  [Normal Affect] : the affect was normal [Alert and Oriented x3] : oriented to person, place, and time [Normal Insight/Judgement] : insight and judgment were intact [Normal Mood] : the mood was normal [de-identified] : No facial asymmetry

## 2020-04-22 NOTE — REVIEW OF SYSTEMS
[Fever] : no fever [Vision Problems] : no vision problems [Shortness Of Breath] : no shortness of breath [Nasal Discharge] : no nasal discharge [Chest Pain] : no chest pain [Abdominal Pain] : no abdominal pain [Back Pain] : back pain [Headache] : no headache

## 2020-04-22 NOTE — HISTORY OF PRESENT ILLNESS
[Home] : at home, [unfilled] , at the time of the visit. [Medical Office: ([facility])___] : at the medical office located in  [Patient] : the patient [FreeTextEntry8] : CARL VALDES is a 68 yo man with hypertension, CAD s/p stents, hypercholesterolemia who requested a visit due to achy lower back for the past few days.  The patient denies severe back pain, leg pain, leg weakness, bowel or bladder symptoms.  He is walking 2 miles daily without difficulty.  He feels well overall.  He took one ibuprofen and it went away.  The patient knows and tried to avoid ibuprofen due to his being on aspirin and plavix.  He will try tylenol prn.\par \par Medical problems stable on current medications\par \par The patient denies fever, cough, sob, loose stool, known exposure to Covid 19.

## 2020-06-01 ENCOUNTER — APPOINTMENT (OUTPATIENT)
Dept: INTERNAL MEDICINE | Facility: CLINIC | Age: 70
End: 2020-06-01

## 2020-06-02 ENCOUNTER — APPOINTMENT (OUTPATIENT)
Dept: INTERNAL MEDICINE | Facility: CLINIC | Age: 70
End: 2020-06-02
Payer: MEDICARE

## 2020-06-02 VITALS
SYSTOLIC BLOOD PRESSURE: 136 MMHG | OXYGEN SATURATION: 97 % | HEIGHT: 71.5 IN | HEART RATE: 72 BPM | DIASTOLIC BLOOD PRESSURE: 86 MMHG | WEIGHT: 220 LBS | BODY MASS INDEX: 30.13 KG/M2 | TEMPERATURE: 97.3 F

## 2020-06-02 DIAGNOSIS — Z95.5 PRESENCE OF CORONARY ANGIOPLASTY IMPLANT AND GRAFT: ICD-10-CM

## 2020-06-02 DIAGNOSIS — Z11.59 ENCOUNTER FOR SCREENING FOR OTHER VIRAL DISEASES: ICD-10-CM

## 2020-06-02 DIAGNOSIS — E78.5 HYPERLIPIDEMIA, UNSPECIFIED: ICD-10-CM

## 2020-06-02 DIAGNOSIS — I10 ESSENTIAL (PRIMARY) HYPERTENSION: ICD-10-CM

## 2020-06-02 PROCEDURE — 36415 COLL VENOUS BLD VENIPUNCTURE: CPT

## 2020-06-02 PROCEDURE — 99214 OFFICE O/P EST MOD 30 MIN: CPT | Mod: 25

## 2020-06-02 RX ORDER — TADALAFIL 20 MG/1
20 TABLET ORAL
Qty: 10 | Refills: 0 | Status: ACTIVE | COMMUNITY
Start: 2020-01-22

## 2020-06-02 RX ORDER — NITROGLYCERIN 0.4 MG/1
0.4 TABLET SUBLINGUAL
Qty: 25 | Refills: 0 | Status: ACTIVE | COMMUNITY
Start: 2020-04-27

## 2020-06-02 RX ORDER — AMLODIPINE BESYLATE 5 MG/1
5 TABLET ORAL
Qty: 90 | Refills: 0 | Status: DISCONTINUED | COMMUNITY
Start: 2020-05-30

## 2020-06-02 RX ORDER — RANOLAZINE 500 MG/1
500 TABLET, EXTENDED RELEASE ORAL
Qty: 180 | Refills: 0 | Status: ACTIVE | COMMUNITY
Start: 2020-04-27

## 2020-06-02 NOTE — HISTORY OF PRESENT ILLNESS
[FreeTextEntry1] : BP check [de-identified] : CARL VALDES is a 68 yo man with CAD s/p stent, carotid artery disease, hypercholesterolemia here for a bp check. Feeling well overall\par \par Seeing pulm Dr Garcia for [facility]. Sleeping better with mask\par \par The patient just saw his cardiologist Dr BALAJI Salomon and has a nuclear stress test and carotid u/s, reportedly normal\par \par The patient walks and bike regularly for exercise

## 2020-06-02 NOTE — PHYSICAL EXAM
[No Acute Distress] : no acute distress [Well Nourished] : well nourished [Well Developed] : well developed [Well-Appearing] : well-appearing [Normal Outer Ear/Nose] : the outer ears and nose were normal in appearance [Normal TMs] : both tympanic membranes were normal [Normal Oropharynx] : the oropharynx was normal [No Lymphadenopathy] : no lymphadenopathy [No Respiratory Distress] : no respiratory distress  [No Accessory Muscle Use] : no accessory muscle use [Clear to Auscultation] : lungs were clear to auscultation bilaterally [Normal Rate] : normal rate  [Regular Rhythm] : with a regular rhythm [Normal S1, S2] : normal S1 and S2 [No Edema] : there was no peripheral edema [No Murmur] : no murmur heard [Normal Gait] : normal gait [Alert and Oriented x3] : oriented to person, place, and time

## 2020-06-02 NOTE — REVIEW OF SYSTEMS
[Fever] : no fever [Vision Problems] : no vision problems [Nasal Discharge] : no nasal discharge [Chest Pain] : no chest pain [Shortness Of Breath] : no shortness of breath [Abdominal Pain] : no abdominal pain [Headache] : no headache

## 2020-06-03 LAB
ALBUMIN SERPL ELPH-MCNC: 4.6 G/DL
ALP BLD-CCNC: 77 U/L
ALT SERPL-CCNC: 16 U/L
ANION GAP SERPL CALC-SCNC: 14 MMOL/L
AST SERPL-CCNC: 23 U/L
BASOPHILS # BLD AUTO: 0.02 K/UL
BASOPHILS NFR BLD AUTO: 0.3 %
BILIRUB SERPL-MCNC: 0.7 MG/DL
BUN SERPL-MCNC: 20 MG/DL
CALCIUM SERPL-MCNC: 9.5 MG/DL
CHLORIDE SERPL-SCNC: 100 MMOL/L
CHOLEST SERPL-MCNC: 155 MG/DL
CHOLEST/HDLC SERPL: 4.8 RATIO
CO2 SERPL-SCNC: 22 MMOL/L
CREAT SERPL-MCNC: 0.88 MG/DL
EOSINOPHIL # BLD AUTO: 0.09 K/UL
EOSINOPHIL NFR BLD AUTO: 1.4 %
ESTIMATED AVERAGE GLUCOSE: 108 MG/DL
GLUCOSE SERPL-MCNC: 104 MG/DL
HBA1C MFR BLD HPLC: 5.4 %
HCT VFR BLD CALC: 42.5 %
HDLC SERPL-MCNC: 32 MG/DL
HGB BLD-MCNC: 13.8 G/DL
IMM GRANULOCYTES NFR BLD AUTO: 0.3 %
LDLC SERPL CALC-MCNC: 83 MG/DL
LYMPHOCYTES # BLD AUTO: 1.42 K/UL
LYMPHOCYTES NFR BLD AUTO: 22 %
MAN DIFF?: NORMAL
MCHC RBC-ENTMCNC: 30.7 PG
MCHC RBC-ENTMCNC: 32.5 GM/DL
MCV RBC AUTO: 94.7 FL
MONOCYTES # BLD AUTO: 0.64 K/UL
MONOCYTES NFR BLD AUTO: 9.9 %
NEUTROPHILS # BLD AUTO: 4.25 K/UL
NEUTROPHILS NFR BLD AUTO: 66.1 %
PLATELET # BLD AUTO: 164 K/UL
POTASSIUM SERPL-SCNC: 4.5 MMOL/L
PROT SERPL-MCNC: 7.3 G/DL
RBC # BLD: 4.49 M/UL
RBC # FLD: 11.9 %
SARS-COV-2 IGG SERPL IA-ACNC: 0.1 INDEX
SARS-COV-2 IGG SERPL QL IA: NEGATIVE
SODIUM SERPL-SCNC: 137 MMOL/L
TRIGL SERPL-MCNC: 202 MG/DL
WBC # FLD AUTO: 6.44 K/UL

## 2020-07-06 ENCOUNTER — TRANSCRIPTION ENCOUNTER (OUTPATIENT)
Age: 70
End: 2020-07-06

## 2020-07-10 ENCOUNTER — APPOINTMENT (OUTPATIENT)
Dept: NEUROLOGY | Facility: CLINIC | Age: 70
End: 2020-07-10

## 2020-07-10 ENCOUNTER — APPOINTMENT (OUTPATIENT)
Dept: NEUROLOGY | Facility: CLINIC | Age: 70
End: 2020-07-10
Payer: MEDICARE

## 2020-07-10 VITALS
WEIGHT: 217 LBS | HEIGHT: 71.5 IN | DIASTOLIC BLOOD PRESSURE: 95 MMHG | BODY MASS INDEX: 29.72 KG/M2 | SYSTOLIC BLOOD PRESSURE: 160 MMHG | HEART RATE: 63 BPM

## 2020-07-10 VITALS — TEMPERATURE: 97.2 F

## 2020-07-10 DIAGNOSIS — G40.109 LOCALIZATION-RELATED (FOCAL) (PARTIAL) SYMPTOMATIC EPILEPSY AND EPILEPTIC SYNDROMES WITH SIMPLE PARTIAL SEIZURES, NOT INTRACTABLE, W/OUT STATUS EPILEPTICUS: ICD-10-CM

## 2020-07-10 DIAGNOSIS — R25.1 TREMOR, UNSPECIFIED: ICD-10-CM

## 2020-07-10 PROCEDURE — 99214 OFFICE O/P EST MOD 30 MIN: CPT

## 2020-07-10 NOTE — HISTORY OF PRESENT ILLNESS
[FreeTextEntry1] : Cardiologist - Shola Quinteros MD - [phone]\par \par *** 07/10/2020  ***\par Mr. CARL VALDES returns for scheduled follow-up appointment. Mr. VALDES reports that in the interval since his last visit, he is doing well. No interval seizures. Endorses increased anxiety and irritability in setting of COVID19 pandemic. Next MRI due for March 2021. \par \par *** 01/24/2020  ***\par Mr. CARL VALDES returns for scheduled follow-up appointment. Mr. VALDES reports that in the interval since his last visit, he is doing well. No interval seizures. Last MRI on March 14, 2019 for f/u meningioma from 2000s showed no disease. Tremor unchanged from last visit - not interfering. Mr. VALDES notes that occasionally he become horse when using his voice a lot - problem present since carotid endarterectomy.  Also has some numbness of skin over carotid.  \par \par *** 07/23/2019  ***\par Mr. CARL VALDES returns for scheduled follow-up appointment. Mr. VALDES reports that in the interval since his last visit, he is doing well. No interval "funny feeling", but he has noticed fine tremor since starting lacosamide. He feels it does not interfere with him and he is otherwise happy with lacosamide. \par \par He has a number of stressors - 39y daughter had hip replacement that dislocated, when replaced developed staph infection now in IV Abx x 6wk. Mother has dementia and was delirious and taken to [facility]. 2nd daughter is pregnant - driving back to [place] with her. \par \par *** 04/23/2019  ***\par Mr. VALDES is tolerating lacosamide well, no problems. He is eager to taper off topiramate. Last week he had coronary artery stents placed. Mr. VALDES is currently on dual antiplatelet therapy, having addid Plavix to ASA. \par \par *** 03/26/2019 ***\par  Mr. VALDES had L carotide endarterectomy. Still bothered by surgey scar.  he recalls that aura was one of anxiety. Had one again the other night. Caring for a ill mother - dementia-  that causes a lot of stress. Aura may occur a few times a year.  This has been his pattern over years. The aura feeling is described as "my mind wanders"; he was driving yesterday and decided to pull over. Feeling lasts a few minutes. \par \par *** 10/31/2018 ***\par 68y M previously seen by Dr. Vega.  Dx with R frontal convexity meningioma after presenting with seizure in 2001. Resected by Dr. Porras shortly after it was found.  Initially on Dilantin, but developed rash and there was also concern for hepatotoxicity (prior h/o ETOHA with liver failure?). He was eventually put on topiramate 100 q12 and has been seizure free for last seizure 8 years.  His aura was a "strange feeling"  that a seizure was going to happen.\par

## 2020-07-10 NOTE — ASSESSMENT
[FreeTextEntry1] : Mr. VALDES developed seizures in setting of meningioma. He has been free of convulsions - and perhaps seizures - for many years. However, he endorsed a "strange" feeling he gets a few times a year lasting a minute or two. He started lacosamide in March, to see if "strange feeling" was suppressed. So far, he feels very well on lacosamide, but has noticed fine tremor on left hand that has not worsened.\par \par Plan:\par 1. continue Vimpat 100 bid.\par 2. RTC 6 mo. \par 3. recheck spiral drawing next visit. \par \par I have spent 25 minutes of face to face time with the patient reviewing the cause of seizures or seizure-like events, assessing the risk of recurrence, educating the patient or family to recognize seizures, and discussing possible treatment options and possible side effects of seizure medications. I also discussed seizure safety, and ways of reducing seizure risk. Greater than 50% of the encounter time was spent on counseling and coordination of care for reviewing records in [employer], discussion with patient regarding plan.

## 2020-07-20 ENCOUNTER — TRANSCRIPTION ENCOUNTER (OUTPATIENT)
Age: 70
End: 2020-07-20

## 2020-08-01 ENCOUNTER — RX RENEWAL (OUTPATIENT)
Age: 70
End: 2020-08-01

## 2020-08-03 ENCOUNTER — TRANSCRIPTION ENCOUNTER (OUTPATIENT)
Age: 70
End: 2020-08-03

## 2020-08-08 ENCOUNTER — TRANSCRIPTION ENCOUNTER (OUTPATIENT)
Age: 70
End: 2020-08-08

## 2020-09-08 ENCOUNTER — TRANSCRIPTION ENCOUNTER (OUTPATIENT)
Age: 70
End: 2020-09-08

## 2020-09-08 RX ORDER — ALPRAZOLAM 0.25 MG/1
0.25 TABLET ORAL
Qty: 30 | Refills: 0 | Status: ACTIVE | COMMUNITY
Start: 2018-10-31 | End: 1900-01-01

## 2020-10-02 PROBLEM — I77.71 CAROTID ARTERY DISSECTION: Status: ACTIVE | Noted: 2017-03-08

## 2020-10-12 ENCOUNTER — RX RENEWAL (OUTPATIENT)
Age: 70
End: 2020-10-12

## 2020-10-13 ENCOUNTER — TRANSCRIPTION ENCOUNTER (OUTPATIENT)
Age: 70
End: 2020-10-13

## 2020-10-13 RX ORDER — PRAVASTATIN SODIUM 40 MG/1
40 TABLET ORAL
Qty: 90 | Refills: 2 | Status: ACTIVE | COMMUNITY
Start: 2020-03-19 | End: 1900-01-01

## 2020-10-14 ENCOUNTER — TRANSCRIPTION ENCOUNTER (OUTPATIENT)
Age: 70
End: 2020-10-14

## 2020-10-14 RX ORDER — LACOSAMIDE 100 MG/1
100 TABLET, FILM COATED ORAL
Qty: 60 | Refills: 5 | Status: ACTIVE | COMMUNITY
Start: 2019-03-26 | End: 1900-01-01

## 2020-12-14 ENCOUNTER — APPOINTMENT (OUTPATIENT)
Dept: INTERNAL MEDICINE | Facility: CLINIC | Age: 70
End: 2020-12-14

## 2021-01-12 ENCOUNTER — APPOINTMENT (OUTPATIENT)
Dept: NEUROLOGY | Facility: CLINIC | Age: 71
End: 2021-01-12

## 2021-03-10 ENCOUNTER — TRANSCRIPTION ENCOUNTER (OUTPATIENT)
Age: 71
End: 2021-03-10

## 2021-05-06 ENCOUNTER — APPOINTMENT (RX ONLY)
Dept: URBAN - METROPOLITAN AREA CLINIC 153 | Facility: CLINIC | Age: 71
Setting detail: DERMATOLOGY
End: 2021-05-06

## 2021-05-06 ENCOUNTER — RX ONLY (OUTPATIENT)
Age: 71
Setting detail: RX ONLY
End: 2021-05-06

## 2021-05-06 DIAGNOSIS — D18.0 HEMANGIOMA: ICD-10-CM

## 2021-05-06 DIAGNOSIS — B35.1 TINEA UNGUIUM: ICD-10-CM

## 2021-05-06 DIAGNOSIS — D22 MELANOCYTIC NEVI: ICD-10-CM

## 2021-05-06 DIAGNOSIS — B35.4 TINEA CORPORIS: ICD-10-CM

## 2021-05-06 DIAGNOSIS — L82.1 OTHER SEBORRHEIC KERATOSIS: ICD-10-CM

## 2021-05-06 DIAGNOSIS — Z71.89 OTHER SPECIFIED COUNSELING: ICD-10-CM

## 2021-05-06 PROBLEM — D48.5 NEOPLASM OF UNCERTAIN BEHAVIOR OF SKIN: Status: ACTIVE | Noted: 2021-05-06

## 2021-05-06 PROBLEM — D22.5 MELANOCYTIC NEVI OF TRUNK: Status: ACTIVE | Noted: 2021-05-06

## 2021-05-06 PROBLEM — D18.01 HEMANGIOMA OF SKIN AND SUBCUTANEOUS TISSUE: Status: ACTIVE | Noted: 2021-05-06

## 2021-05-06 PROCEDURE — ? COUNSELING

## 2021-05-06 PROCEDURE — ? TREATMENT REGIMEN

## 2021-05-06 PROCEDURE — ? DEFER

## 2021-05-06 PROCEDURE — 99203 OFFICE O/P NEW LOW 30 MIN: CPT

## 2021-05-06 PROCEDURE — ? PRESCRIPTION

## 2021-05-06 RX ORDER — KETOCONAZOLE 20 MG/G
1 CREAM TOPICAL BID
Qty: 1 | Refills: 0 | Status: ERX | COMMUNITY
Start: 2021-05-06

## 2021-05-06 RX ORDER — CICLOPIROX 80 MG/ML
1 SOLUTION TOPICAL QD
Qty: 1 | Refills: 6 | Status: ERX | COMMUNITY
Start: 2021-05-06

## 2021-05-06 RX ORDER — EFINACONAZOLE 100 MG/ML
SOLUTION TOPICAL QD
Qty: 1 | Refills: 6 | Status: CANCELLED
Stop reason: CLARIF

## 2021-05-06 RX ADMIN — KETOCONAZOLE 1: 20 CREAM TOPICAL at 00:00

## 2021-05-06 ASSESSMENT — LOCATION SIMPLE DESCRIPTION DERM
LOCATION SIMPLE: CHEST
LOCATION SIMPLE: LEFT 4TH TOE
LOCATION SIMPLE: POSTERIOR NECK
LOCATION SIMPLE: LEFT PRETIBIAL REGION
LOCATION SIMPLE: LEFT GREAT TOE
LOCATION SIMPLE: LEFT 5TH TOE
LOCATION SIMPLE: LEFT 2ND TOE
LOCATION SIMPLE: LEFT 3RD TOE

## 2021-05-06 ASSESSMENT — LOCATION ZONE DERM
LOCATION ZONE: TOENAIL
LOCATION ZONE: LEG
LOCATION ZONE: TRUNK
LOCATION ZONE: NECK

## 2021-05-06 ASSESSMENT — LOCATION DETAILED DESCRIPTION DERM
LOCATION DETAILED: LEFT LATERAL INFERIOR CHEST
LOCATION DETAILED: LEFT DISTAL PRETIBIAL REGION
LOCATION DETAILED: LEFT MEDIAL INFERIOR CHEST
LOCATION DETAILED: LEFT 3RD TOENAIL
LOCATION DETAILED: LEFT 2ND TOENAIL
LOCATION DETAILED: LEFT 4TH TOENAIL
LOCATION DETAILED: LEFT 5TH TOENAIL
LOCATION DETAILED: LEFT GREAT TOENAIL
LOCATION DETAILED: RIGHT LATERAL TRAPEZIAL NECK

## 2021-05-06 NOTE — PROCEDURE: TREATMENT REGIMEN
Detail Level: Simple
Initiate Treatment: Patient to apply Ketoconazole 2% topical cream to the left lower leg twice daily for one month. Patient to call the office with any questions or concerns or if the area does not resolve. Photo taken today
Initiate Treatment: Patient to apply Jublia 10 % topical solution once daily to affected toenails on the left foot and wipe off with an alcohol wipe weekly for 1 year. Discussed with the patient that due to the prescription if there is an issue with the price to call the office and we can send in penlac topical solution. Patient to call the office with any questions or concerns.
Detail Level: Detailed

## 2021-05-06 NOTE — PROCEDURE: DEFER
Instructions (Optional): Discussed with the patient that we would like to perform a biopsy on the right trapezial neck for a more defective diagnosis. Discussed with the patient that due to his insurance we would schedule him a separate appointment  to have the lesion biopsied. Photo taken today and the lesion was measured at 5x3 mm
Introduction Text (Please End With A Colon): The following procedure was deferred:
Detail Level: Detailed

## 2021-05-24 ENCOUNTER — APPOINTMENT (RX ONLY)
Dept: URBAN - METROPOLITAN AREA CLINIC 153 | Facility: CLINIC | Age: 71
Setting detail: DERMATOLOGY
End: 2021-05-24

## 2021-05-24 DIAGNOSIS — D22 MELANOCYTIC NEVI: ICD-10-CM

## 2021-05-24 PROBLEM — D48.5 NEOPLASM OF UNCERTAIN BEHAVIOR OF SKIN: Status: ACTIVE | Noted: 2021-05-24

## 2021-05-24 PROCEDURE — 11102 TANGNTL BX SKIN SINGLE LES: CPT

## 2021-05-24 PROCEDURE — ? BIOPSY BY SHAVE METHOD

## 2021-05-24 ASSESSMENT — LOCATION SIMPLE DESCRIPTION DERM: LOCATION SIMPLE: POSTERIOR NECK

## 2021-05-24 ASSESSMENT — LOCATION ZONE DERM: LOCATION ZONE: NECK

## 2021-05-24 ASSESSMENT — LOCATION DETAILED DESCRIPTION DERM: LOCATION DETAILED: RIGHT LATERAL TRAPEZIAL NECK

## 2021-12-06 ENCOUNTER — APPOINTMENT (RX ONLY)
Dept: URBAN - METROPOLITAN AREA CLINIC 153 | Facility: CLINIC | Age: 71
Setting detail: DERMATOLOGY
End: 2021-12-06

## 2021-12-06 DIAGNOSIS — L91.0 HYPERTROPHIC SCAR: ICD-10-CM

## 2021-12-06 DIAGNOSIS — L57.0 ACTINIC KERATOSIS: ICD-10-CM

## 2021-12-06 PROCEDURE — ? COUNSELING

## 2021-12-06 PROCEDURE — 17000 DESTRUCT PREMALG LESION: CPT

## 2021-12-06 PROCEDURE — 99212 OFFICE O/P EST SF 10 MIN: CPT | Mod: 25

## 2021-12-06 PROCEDURE — ? LIQUID NITROGEN

## 2021-12-06 ASSESSMENT — LOCATION SIMPLE DESCRIPTION DERM
LOCATION SIMPLE: RIGHT ANTERIOR NECK
LOCATION SIMPLE: LEFT FOREARM

## 2021-12-06 ASSESSMENT — LOCATION DETAILED DESCRIPTION DERM
LOCATION DETAILED: RIGHT CLAVICULAR NECK
LOCATION DETAILED: LEFT DISTAL DORSAL FOREARM

## 2021-12-06 ASSESSMENT — LOCATION ZONE DERM
LOCATION ZONE: NECK
LOCATION ZONE: ARM

## 2022-04-11 PROBLEM — Z11.59 SCREENING FOR VIRAL DISEASE: Status: ACTIVE | Noted: 2020-06-02

## 2022-05-02 ENCOUNTER — APPOINTMENT (RX ONLY)
Dept: URBAN - METROPOLITAN AREA CLINIC 153 | Facility: CLINIC | Age: 72
Setting detail: DERMATOLOGY
End: 2022-05-02

## 2022-05-02 DIAGNOSIS — Z02.9 ENCOUNTER FOR ADMINISTRATIVE EXAMINATIONS, UNSPECIFIED: ICD-10-CM

## 2022-05-13 NOTE — H&P PST ADULT - BP NONINVASIVE DIASTOLIC (MM HG)
Referring provider:Dr. Juan Parra   Primary eye provider:Dr. Marvin Chacon, DO  PREFERS TO BE CALLED:ADAM   Does Mr. Crain have  today: Yes. Last dilate exam each eye: 5/10/21  No myocardial infarction, no stroke as of 5/13/22  Last comprehensive exam: 5/10/21  Last Ocular coherence tomography of the optic nerves-7/7/20    CHIEF COMPLAINT (TECHNICIAN):  6 week followup exudative macular degeneration right eye and possible Lucentis right eye. Patient states vision is stable. COMPREHENSIVE EXAM.    HISTORY OF PRESENT ILLNESS:  Mr. 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 No new complaints.     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  Choroidal neovascularization right eye  Exudative macular degeneration, right eye  Nonexudative macular degeneration, left eye  Amblyopia left eye, secondary to exotropia  Cataract, left eye  Pseudophakia, right eye  Glaucoma suspect, each eye      PAST OCULAR PROCEDURES:    RIGHT EYE: PRN  Avastin : 6/23/14 (#1), 7/28/14, 8/27/14, 9/15/16, 3/27/17, 5/22/17, 7/10/17, 8/7/17, 9/11/17, 10/9/17, 11/27/17, 11/26/19    Eylea: 1/15/18, 2/23/18, 3/23/18, 5/7/18, 6/4/18, 7/9/18, 8/21/18, 9/21/18, 11/12/18, 12/13/18, 1/21/19, 2/21/19, 3/21/19, 4/22/19, 5/23/19, 7/8/19, 8/5/19, 9/3/19, 11/5/19    Lucentis:  1/9/20, 2/6/20, 3/10/20, 4/7/20, 5/12/20, 6/9/20, 7/7/20, 8/4/20, 9/1/20, 10/1/20, 11/17/20, 1/4/21, 2/15/21, 3/29/21, 5/10/21, 6/15/21, 7/27/21, 9/7/21, 11/4/21, 1/7/22, 2/18/22, 4/1/22, 5/13/22    YAG capsulotomy right eye 1-28-19    Eye Meds:  Refresh drops both eyes twice daily    Base Eye Exam     Visual Acuity (Snellen - Linear)       Right Left    Dist cc 20/40 +1 20/350    Dist ph cc No Improvement     Correction: Glasses          Tonometry (Applanation, 8:10 AM)       Right Left    Pressure 10 11          Pupils       Pupils Dark Light Shape React APD    Right PERRL 4 3.5 Round Brisk Negative    Left PERRL 4 3.5 Round Brisk Negative          Visual Fields       Left Right     Full Full          Extraocular Movement       Right Left     Full Full   Amblyopia left eye/ exotropia           Neuro/Psych     Oriented x3: Yes    Mood/Affect: Normal          Dilation     Both eyes: 1.0% Tropicamide, 10% Neosynephrine @ 8:10 AM            Slit Lamp and Fundus Exam     External Exam       Right Left    External Normal Normal          Slit Lamp Exam       Right Left    Lids/Lashes Normal Normal    Conjunctiva/Sclera Clear Clear    Cornea Stable opacity inferiorly at 0400. Few punctate epithelial erosions Clear    Anterior Chamber Deep and quiet Deep and quiet    Iris Round and regular Round and regular    Lens Centered posterior chamber intraocular lens, Open posterior capsule 2+ Nuclear sclerosis, 1+ Cortical cataract    Vitreous Posterior vitreous detachment, no cell Posterior vitreous detachment          Fundus Exam       Right Left    Disc Flat, pink with a healthy rim Flat, pink with a healthy rim    C/D Ratio 0.5 0.5    Macula 1-2+ intermediate drusen with moderate retinal pigment epithelial changes/mottling centrally. Stable fibrosis along the inferotemporal arcade. There is no hemorrhage/exudate/fluid visible Mild RPE changes    Vessels Healthy with normal Artery-Vein ratio Healthy with normal Artery-Vein ratio    Periphery Flat, healthy, without tears or detachments Flat, healthy, without tears or detachments            TEST: Ocular Coherence Tomography - Macula      Indication for test:  Exudative macular degeneration, right eye. Follow-up examination to monitor for progression. Impression:       Right eye:  Central sub field thickness 194. There is a fair foveal contour. There is a stable pigment epithelial detachment. There is questionable early fluid. Left eye:  Central sub field thickness 251.  There is a good foveal contour. There is mild retinal pigment epithelial disruption. There is no intra or subretinal fluid. PROCEDURE PERFORMED: Intravitreal Lucentis (0.5 mg/0.05 mL) injection Right Eye    INDICATIONS: exudative age-related macular degeneration right eye    ANESTHESIA:  Proparacaine 0.5%, Tetracaine 0.5% and Subconjunctival lidocaine    SPECIMEN: None    COMPLICATIONS: None    ESTIMATED BLOOD LOSS: None    DESCRIPTION OF PROCEDURE:  Treatment options were reviewed and after discussion we have elected to continue with the Lucentis injection. The above-mentioned topical anesthetic was placed into the eye for approximately 5 minutes. An eyelid speculum was placed and the eye was then swabbed with povidone iodine 5%. 0.1 CC of Lidocaine was then injected subconjunctivally in the inferotemporal bulbar conjunctiva. This was allowed to sit for 5 minutes. The eye was again swabbed with Povidone Iodine 5%. Then the disposable caliper was used to juju the site of injection and Lucentis was injected 3.5-4.0 mm from the limbus in the inferotemporal quadrant. Their vision was checked and confirmed to be at least count fingers before the completion of the visit. DISPOSITION: The patient tolerated the procedure well Without complication. Â   ASSESSMENT/PLAN:    Â   1. Exudative macular degeneration of the right eye. Mild early fluid today, otherwise his exam remains stable. We have elected to continue with the Lucentis injections at the 4-6 week interval.  Â   2. Nonexudative macular degeneration of the left eye. Continue to monitor both eyes with Amsler grid or equivalent. Call or return immediately with any changes. 3. History of central serous retinopathy. Stable. As above. 4. Amblyopia of the left eye secondary to exotropia. Stable. Eye protection at all times. 5. Pseudophakia of the right eye. Stable. He can update glasses at his discretion. 6. Cataract of the left eye.   Not visually significant. 7. Glaucoma suspect. Workup and management per Dr. Vincent Feng. Â         FOLLOW UP:  4-6 weeks for exudative macular degeneration of the right eye. Please dilate the right eye only with Ocular coherence tomography of the macula in each eye. Likely Lucentis in the right eye. 89

## 2023-04-03 NOTE — PATIENT PROFILE ADULT - NSPROEDALEARNPREF_GEN_A_NUR
Family Medicine Service - Daily Progress Note    Patient Name: Efraín Mead   Date: 12/31/20      SUBJECTIVE:     No acute overnight events.     Spoke with patient's sister yesterday - informed her that are we still awaiting final acceptance by [facility].     Pt remains comfortable appearing on exam this morning; no new/acute findings or changes in pt status. Did note a list of pt's preferred menu choices on his door this morning - as always, appreciative of nursing staff's care and attentiveness.     Unable to obtain ROS d/t pt's non-verbal status.     OBJECTIVE:     Last Recorded Vitals:  Vitals with min/max:  Vital Last Value 24 Hour Range   Temperature 97.9 °F (36.6 °C) (12/30/20 2244) Temp  Min: 97.9 °F (36.6 °C)  Max: 98.1 °F (36.7 °C)   Pulse 88 (12/30/20 2244) Pulse  Min: 72  Max: 88   Respiratory 16 (12/30/20 2244) Resp  Min: 15  Max: 17   Non-Invasive  Blood Pressure 127/73 (12/30/20 2244) BP  Min: 127/73  Max: 135/85   Pulse Oximetry 95 % (12/30/20 2244) SpO2  Min: 95 %  Max: 96 %   Arterial   Blood Pressure   No data recorded      Intake/Output:    Intake/Output Summary (Last 24 hours) at 12/31/2020 0625  Last data filed at 12/30/2020 1859  Gross per 24 hour   Intake 600.94 ml   Output 30 ml   Net 570.94 ml     Physical Exam  Vitals signs and nursing note reviewed.   Constitutional: He is awake, alert, reclined comfortably in bed watching television. He is not in acute distress. He is not toxic-appearing or diaphoretic.   HENT: Atraumatic, normocephalic. Nose normal. No rhinorrhea. Mucous membranes moist.   Eyes: Vision grossly intact. Extraocular movements grossly intact. Conjunctivae normal.   Neck: Supple.   Cardiovascular: Normal rate and regular rhythm. Normal heart sounds. Normal pulses.   Pulmonary: Breathing comfortably on room air. Lungs CTAB; no wheezing, rhonchi or rales.   Abdominal: Abdomen is soft, non-distended; no signs of tenderness on palpation. LLQ ostomy with moderate amount of brown 
output, stoma pink; no surrounding erythema. Wound VAC in place.   Musculoskeletal: No peripheral edema. Moves all limbs spontaneously. No gross deformities.   Skin: Warm, dry, well perfused. PIV x2 (b/l arms), midline (R arm) in place; no erythema or swelling around insertion sites.    Neurological: No focal deficit present. He is alert. Mental status is at baseline. Cranial nerves, motor function, and gait are grossly intact.   Psychiatric: Non-verbal at baseline. Cooperative.     Labs  BMP   Recent Labs   Lab 12/30/20  0655 12/29/20  0827 12/28/20  0637 12/27/20  0832 12/26/20  0648   SODIUM 143 144 142 143 143   POTASSIUM 3.8 4.3 4.0 3.4 3.2*   CHLORIDE 114* 112* 111* 109* 111*   CO2 25 29 25 30 27   ANIONGAP 8* 7* 10 7* 8*   BUN 14 12 10 12 10   CREATININE 0.65* 0.74 0.64* 0.64* 0.59*   GLUCOSE 74 87 74 92 92   CALCIUM 8.3* 8.9 8.8 8.5 7.9*     CBC & COAGULATION PANEL   Recent Labs   Lab 12/30/20  0655 12/29/20  0827 12/28/20  0637   WBC 9.8 13.4* 11.4*   HGB 14.5 16.2 16.0   HCT 46.9 51.3* 51.4*    360 323   ANEUT 7.5 10.7* 8.6*   ALYMS 1.6 1.8 2.0   CARL 0.5 0.6 0.5   AEOS 0.2 0.2 0.2   ABASO 0.1 0.1 0.1     HEPATIC FUNCTION PANEL   Recent Labs   Lab 12/30/20  0655 12/29/20  0827 12/28/20  0637 12/27/20  0832 12/26/20  0648   CALCIUM 8.3* 8.9 8.8 8.5 7.9*   TOTPROTEIN 6.5 7.5 7.8 7.1 6.2*   ALBUMIN 2.4* 2.8* 2.7* 2.5* 2.1*   AST 24 32 37 36 26   GPT 34 38 39 35 25   ALKPT 102 116 116 104 93   BILIRUBIN 0.2 0.3 0.3 0.3 0.3     Lactic Acid  No results found    Invalid input(s): ESR  Cultures/Microbiology:   Blood cultures (12/22): No growth [final].  Wound Cx and gram stain (12/22): growing pan-susceptible E. Coli and Bacteroides fragilis.    Imaging  IR PICC LINE INSERTION AGE 6 OR OLDER   Final Result   Impression: Successful placement of a single lumen power midline catheter in the right arm.  Catheter ready for use.      Electronically Signed by: DANELLE BOWEN MD    Signed on: 12/29/2020 4:53 PM     
     CT ABDOMEN PELVIS WO CONTRAST   Final Result    Persistent large ventral abdominal fluid collection which now contains numerous foci of gas and worsening with now extensive surrounding inflammatory fat stranding..  This is highly suggestive of an abscess.  There are now several tracts extending from    this collection and draining to the skin surface suggestive of visualization.  Underlying enterocutaneous fistula is difficult to exclude without intravenous or enteric contrast.  Visualization with the adjacent left-sided ostomy is also not excluded.     Wall thickening of the bowel within the ostomy is noted.  Frontal abdominal cutaneous thickening suggests a component of cellulitis.      Additional stable an incidental findings are detailed above.      Electronically Signed by: ILEANA ESQUIVEL MD    Signed on: 12/22/2020 8:16 AM             Lab Results Reviewed and Diagnostic Data Reviewed    Inpatient Medications  • aspirin  81 mg Oral Daily   • atorvastatin  40 mg Oral Daily   • ampicillin-sulbactam (UNASYN) IVPB  3 g Intravenous 4 times per day   • acetaminophen  1,000 mg Oral Q8H   • enoxaparin  40 mg Subcutaneous Nightly   • sodium chloride (PF)  2 mL Intracatheter 2 times per day     PRN Meds:  bacitracin, melatonin, sodium chloride    ASSESSMENT AND PLAN:     Momo is a 59 year old male with a PMHx of developmental delay, toxic megacolon s/p colostomy (2016) and recent ventral hernia repair (August 2020) who presents from [facility] for evaluation of draining abdominal wound; admitted for IV antibiotics and surgical intervention. Now s/p I&D of abdominal wall abscess, evacuation of infected hematoma, & excisional debridement of abdominal wall and muscle necrosis on 12/22/20. Patient remains afebrile and hemodynamically stable. Wound vac in place. Medically stable and cleared for discharge to [facility]; repeat COVID test negative on 12/29 - final ECF acceptance.      #Intra-abdominal abscess - [POD#9] s/p 
I&D of abdominal abscess, infected hematoma evacuation, & excisional debridement of necrotic abdominal wall and muscle tissue (12/22/20)  #Hx of toxic megacolon s/p colostomy (2016)  #Ventral hernia s/p surgical repair (August 2020)  -Surgery on consult, appreciate recs  -Infectious disease on consult, appreciate recs  -PT wound care on consult, appreciate recs   -Wound VAC placed 12/23, will be changed MWF by [facility]  -Wound culture (12/22) growing pan-susceptible E.coli + Bacteroides fragilis  -Blood cultures (12/22): No growth [final]  -Midline (R arm) placed by IR on 12/29/20  Plan:  -Continue IV Unasyn 3 g Q6h for a 2 week course (through 1/4/21), per ID  -Scheduled acetaminophen 1g Q8h for pain    #Dispo   - Social work consulted to help with discharge placement, appreciate involvement    - Awaiting final acceptance at [facility] ([facility])  - Pt will need a total 2 week course of IV Unasyn, per ID - midline in place (12/29)    Chronic problems:  #Dysphagia  -Seen by SLP during prior admission in November 2020 -> cleared for a dysphagia 3 diet w/ thin liquids; PO medications should be crushed.  -Continue PTA diet: Dysphagia 3 diet w/ thin liquids and crushed medications     #Hyperlipidemia  -Continue PTA ASA 81mg daily  -Continue PTA atorvastatin 40mg daily    Fluids: None  Electrolytes: Replete PRN  Nutrition: Dysphagia 3 diet w/ thin liquids and crushed medications  DVT PPx: SCDs; lovenox 40mg nightly  GI PPx: Not indicated  Code Status: Full let - patient's legal guardian/POA is Medina Dorsey (sister)  Consults:   IP CONSULT TO GENERAL SURGERY  IP CONSULT TO INFECTIOUS DISEASES  IP CONSULT TO PHYISCAL THERAPY WOUND CARE EVAL & TREAT  IP CONSULT TO SOCIAL WORK  PCP: Dr. Haley Cartagena, CNP      Patient discussed with attending physician, Dr. Oh. Please see attending note/addendum for final plan and recommendations.     Chase Doyle MD  Family Medicine PGY-1  12/31/2020 6:25 AM  
Yes
individual instruction

## 2023-06-01 ENCOUNTER — NON-APPOINTMENT (OUTPATIENT)
Age: 73
End: 2023-06-01

## 2023-07-14 ENCOUNTER — APPOINTMENT (OUTPATIENT)
Dept: CT IMAGING | Facility: IMAGING CENTER | Age: 73
End: 2023-07-14
Payer: MEDICARE

## 2023-07-14 ENCOUNTER — OUTPATIENT (OUTPATIENT)
Dept: OUTPATIENT SERVICES | Facility: HOSPITAL | Age: 73
LOS: 1 days | End: 2023-07-14
Payer: MEDICARE

## 2023-07-14 DIAGNOSIS — C43.59 MALIGNANT MELANOMA OF OTHER PART OF TRUNK: Chronic | ICD-10-CM

## 2023-07-14 DIAGNOSIS — Z98.890 OTHER SPECIFIED POSTPROCEDURAL STATES: Chronic | ICD-10-CM

## 2023-07-14 DIAGNOSIS — I77.9 DISORDER OF ARTERIES AND ARTERIOLES, UNSPECIFIED: ICD-10-CM

## 2023-07-14 DIAGNOSIS — Z98.89 OTHER SPECIFIED POSTPROCEDURAL STATES: Chronic | ICD-10-CM

## 2023-07-14 DIAGNOSIS — S52.532A COLLES' FRACTURE OF LEFT RADIUS, INITIAL ENCOUNTER FOR CLOSED FRACTURE: Chronic | ICD-10-CM

## 2023-07-14 PROCEDURE — 70498 CT ANGIOGRAPHY NECK: CPT

## 2023-07-14 PROCEDURE — 70498 CT ANGIOGRAPHY NECK: CPT | Mod: 26

## 2023-07-20 ENCOUNTER — NON-APPOINTMENT (OUTPATIENT)
Age: 73
End: 2023-07-20

## 2023-08-04 ENCOUNTER — APPOINTMENT (OUTPATIENT)
Dept: NEUROSURGERY | Facility: CLINIC | Age: 73
End: 2023-08-04
Payer: MEDICARE

## 2023-08-04 PROCEDURE — 99442: CPT

## 2024-05-08 NOTE — HISTORY OF PRESENT ILLNESS
[Right] : right hand dominant [FreeTextEntry1] : Pt c/o mass on his right hand x 10 days.  It is not painful.  It does not fluctuate in size.  It is located over the dorsum of his second metacarpal.  Denies numbness or tingling.  He was seen by his dermatologist yesterday and he was advised to follow up with a hand surgeon. The pt denies that mass is not growing. \par He is s/p left wrist ORIF in 2015 by Dr. Maldonado.   Stable

## 2025-04-07 NOTE — PATIENT PROFILE ADULT - RESOURCE/ENVIRONMENTAL CONCERNS
Forms have been signed by provider and faxed.       Copies made and placed in PCP's hold bin. Sent original(s) to records.       Remington Cowan Jr., CMA on 4/7/2025 at 5:45 PM     none